# Patient Record
Sex: FEMALE | Race: BLACK OR AFRICAN AMERICAN | NOT HISPANIC OR LATINO | URBAN - METROPOLITAN AREA
[De-identification: names, ages, dates, MRNs, and addresses within clinical notes are randomized per-mention and may not be internally consistent; named-entity substitution may affect disease eponyms.]

---

## 2018-09-11 ENCOUNTER — EMERGENCY (EMERGENCY)
Facility: HOSPITAL | Age: 83
LOS: 1 days | Discharge: HOME | End: 2018-09-11
Attending: EMERGENCY MEDICINE
Payer: MEDICARE

## 2018-09-11 VITALS
DIASTOLIC BLOOD PRESSURE: 79 MMHG | OXYGEN SATURATION: 99 % | RESPIRATION RATE: 18 BRPM | TEMPERATURE: 99 F | HEART RATE: 82 BPM | SYSTOLIC BLOOD PRESSURE: 138 MMHG

## 2018-09-11 VITALS
HEART RATE: 76 BPM | SYSTOLIC BLOOD PRESSURE: 147 MMHG | RESPIRATION RATE: 16 BRPM | OXYGEN SATURATION: 100 % | DIASTOLIC BLOOD PRESSURE: 63 MMHG | TEMPERATURE: 98 F

## 2018-09-11 DIAGNOSIS — Z98.891 HISTORY OF UTERINE SCAR FROM PREVIOUS SURGERY: Chronic | ICD-10-CM

## 2018-09-11 LAB
ALBUMIN SERPL ELPH-MCNC: 3.9 G/DL — SIGNIFICANT CHANGE UP (ref 3.3–5)
ALP SERPL-CCNC: 88 U/L — SIGNIFICANT CHANGE UP (ref 40–120)
ALT FLD-CCNC: 9 U/L — LOW (ref 10–45)
ANION GAP SERPL CALC-SCNC: 13 MMOL/L — SIGNIFICANT CHANGE UP (ref 5–17)
AST SERPL-CCNC: 23 U/L — SIGNIFICANT CHANGE UP (ref 10–40)
BASOPHILS # BLD AUTO: 0 K/UL — SIGNIFICANT CHANGE UP (ref 0–0.2)
BASOPHILS NFR BLD AUTO: 0.1 % — SIGNIFICANT CHANGE UP (ref 0–2)
BILIRUB SERPL-MCNC: 0.7 MG/DL — SIGNIFICANT CHANGE UP (ref 0.2–1.2)
BUN SERPL-MCNC: 15 MG/DL — SIGNIFICANT CHANGE UP (ref 7–23)
CALCIUM SERPL-MCNC: 10 MG/DL — SIGNIFICANT CHANGE UP (ref 8.4–10.5)
CHLORIDE SERPL-SCNC: 102 MMOL/L — SIGNIFICANT CHANGE UP (ref 96–108)
CO2 SERPL-SCNC: 23 MMOL/L — SIGNIFICANT CHANGE UP (ref 22–31)
CREAT SERPL-MCNC: 0.63 MG/DL — SIGNIFICANT CHANGE UP (ref 0.5–1.3)
EOSINOPHIL # BLD AUTO: 0 K/UL — SIGNIFICANT CHANGE UP (ref 0–0.5)
EOSINOPHIL NFR BLD AUTO: 0.3 % — SIGNIFICANT CHANGE UP (ref 0–6)
GLUCOSE SERPL-MCNC: 99 MG/DL — SIGNIFICANT CHANGE UP (ref 70–99)
HCT VFR BLD CALC: 40.3 % — SIGNIFICANT CHANGE UP (ref 34.5–45)
HGB BLD-MCNC: 13.5 G/DL — SIGNIFICANT CHANGE UP (ref 11.5–15.5)
LYMPHOCYTES # BLD AUTO: 2.8 K/UL — SIGNIFICANT CHANGE UP (ref 1–3.3)
LYMPHOCYTES # BLD AUTO: 24.2 % — SIGNIFICANT CHANGE UP (ref 13–44)
MCHC RBC-ENTMCNC: 28.1 PG — SIGNIFICANT CHANGE UP (ref 27–34)
MCHC RBC-ENTMCNC: 33.5 GM/DL — SIGNIFICANT CHANGE UP (ref 32–36)
MCV RBC AUTO: 83.9 FL — SIGNIFICANT CHANGE UP (ref 80–100)
MONOCYTES # BLD AUTO: 0.6 K/UL — SIGNIFICANT CHANGE UP (ref 0–0.9)
MONOCYTES NFR BLD AUTO: 5.4 % — SIGNIFICANT CHANGE UP (ref 2–14)
NEUTROPHILS # BLD AUTO: 8 K/UL — HIGH (ref 1.8–7.4)
NEUTROPHILS NFR BLD AUTO: 70.1 % — SIGNIFICANT CHANGE UP (ref 43–77)
NT-PROBNP SERPL-SCNC: 523 PG/ML — HIGH (ref 0–300)
PLATELET # BLD AUTO: 230 K/UL — SIGNIFICANT CHANGE UP (ref 150–400)
POTASSIUM SERPL-MCNC: 4.6 MMOL/L — SIGNIFICANT CHANGE UP (ref 3.5–5.3)
POTASSIUM SERPL-SCNC: 4.6 MMOL/L — SIGNIFICANT CHANGE UP (ref 3.5–5.3)
PROT SERPL-MCNC: 7.6 G/DL — SIGNIFICANT CHANGE UP (ref 6–8.3)
RBC # BLD: 4.8 M/UL — SIGNIFICANT CHANGE UP (ref 3.8–5.2)
RBC # FLD: 13.9 % — SIGNIFICANT CHANGE UP (ref 10.3–14.5)
SODIUM SERPL-SCNC: 138 MMOL/L — SIGNIFICANT CHANGE UP (ref 135–145)
WBC # BLD: 11.4 K/UL — HIGH (ref 3.8–10.5)
WBC # FLD AUTO: 11.4 K/UL — HIGH (ref 3.8–10.5)

## 2018-09-11 PROCEDURE — 80053 COMPREHEN METABOLIC PANEL: CPT

## 2018-09-11 PROCEDURE — 99283 EMERGENCY DEPT VISIT LOW MDM: CPT

## 2018-09-11 PROCEDURE — 99284 EMERGENCY DEPT VISIT MOD MDM: CPT | Mod: GC

## 2018-09-11 PROCEDURE — 83880 ASSAY OF NATRIURETIC PEPTIDE: CPT

## 2018-09-11 PROCEDURE — 85027 COMPLETE CBC AUTOMATED: CPT

## 2018-09-11 RX ORDER — ACETAMINOPHEN 500 MG
975 TABLET ORAL ONCE
Qty: 0 | Refills: 0 | Status: COMPLETED | OUTPATIENT
Start: 2018-09-11 | End: 2018-09-11

## 2018-09-11 RX ORDER — IBUPROFEN 200 MG
600 TABLET ORAL ONCE
Qty: 0 | Refills: 0 | Status: COMPLETED | OUTPATIENT
Start: 2018-09-11 | End: 2018-09-11

## 2018-09-11 RX ADMIN — Medication 600 MILLIGRAM(S): at 21:40

## 2018-09-11 RX ADMIN — Medication 975 MILLIGRAM(S): at 22:40

## 2018-09-11 RX ADMIN — Medication 975 MILLIGRAM(S): at 21:40

## 2018-09-11 RX ADMIN — Medication 600 MILLIGRAM(S): at 22:40

## 2018-09-11 NOTE — ED ADULT NURSE NOTE - OBJECTIVE STATEMENT
patient came in with daughter complaining of bilateral foot pain x3 weeks. Right side abdominal pain radiating to right side back since saturday. Denies nausea, vomiting or diarrhea. With known history of constipation & was taking either senokot & or fleet enema. LAst bm was 2 days ago. patient came in with daughter complaining of bilateral foot pain x3 weeks. Right side dull constant abdominal pain radiating to right side back & buttock & down her right leg since saturday. Denies nausea, vomiting or diarrhea. With known history of constipation & was taking either senokot & or fleet enema. LAst bm was 2 days ago. PAtient added that 1 month ago she fell but was fine after that. Seen & examined by MD.

## 2018-09-11 NOTE — ED PROVIDER NOTE - MEDICAL DECISION MAKING DETAILS
Right paraspinal lumbar pain with sciatic component to the right leg, able to ambulate, able to reproduce the back pain and leg pain with rotation of the torso. Mild swelling of the bilateral legs, non-pitting. Basic labs, pain, control, ok to dc with follow up care Right paraspinal lumbar pain with sciatic component to the right leg, able to ambulate, able to reproduce the back pain and leg pain with rotation of the torso. Mild swelling of the bilateral legs, non-pitting. Basic labs, pain, control, ok to dc with follow up care    FANNIE Sherman MD: Pt is a 83 y/o female with PMH of hypothyroidism who p/w right paraspinal lumbar pain that radiates down the back of her R leg. Pain is worse with movement. Pt states that she fell 1 month ago but did not have pain until 3 days ago. Denies any bowel or bladder incontinence, saddle anesthesia, focal numbness or weakness. Pt is still ambulatory, but with some pain. No trauma to the area that she can recall. Also reports chronic, trace intermittent b/l pretibial edema that improves with elevation. Exam without spinal midline ttp. +paraspinal lumbosacral lateral muscle ttp, +SLR on R. Exam c/w MSK pain, likely lumbosacral radiculopathy. No red flags for cauda equina or spinal cord compression. Denies f/c, recent injections, IVDA, major trauma, recent procedures or new neurologic deficits. Plan: pain control, and if pt improves, Ortho/spine f/u within 1 week

## 2018-09-11 NOTE — ED PROVIDER NOTE - PHYSICAL EXAMINATION
msk: right paraspinal tenderness, no midline tenderness, negative straight leg test, pain reproduced down the leg with movement of the torso

## 2018-09-11 NOTE — ED PROVIDER NOTE - PLAN OF CARE
Take all medications as directed.  For pain take Ibuprofen (Motrin, Advil) 400mg-600mg every 6-8 hours or Acetaminophen (Tylenol) 250mg-650mg every 6-8 hours.  Follow up with your primary physician in 3-4 days. If needed call [phone] to find a primary care physician or call  [phone] to schedule an appointment with the general medicine clinic.   You were given copies of all labs and imaging results from this ER visit, please take them to your appointment.  Return to the ER for worsening symptoms or any other concerns.

## 2018-09-11 NOTE — ED PROVIDER NOTE - CARE PLAN
Principal Discharge DX:	Sciatic leg pain  Assessment and plan of treatment:	Take all medications as directed.  For pain take Ibuprofen (Motrin, Advil) 400mg-600mg every 6-8 hours or Acetaminophen (Tylenol) 250mg-650mg every 6-8 hours.  Follow up with your primary physician in 3-4 days. If needed call [phone] to find a primary care physician or call  [phone] to schedule an appointment with the general medicine clinic.   You were given copies of all labs and imaging results from this ER visit, please take them to your appointment.  Return to the ER for worsening symptoms or any other concerns.

## 2018-09-11 NOTE — ED PROVIDER NOTE - OBJECTIVE STATEMENT
83 yo hx of hypothyroid p/w right paraspinal lumbar pain with pain radiating around the hip to the sacrum down the leg. Pain is worse with movements. Fell 1 month ago but did not have pain until 3 days ago. Pt denies any bowel or bladder incontinence. Pt is very active at baseline, walks on her own, but recently has been limited due to the pain. No trauma to the area that she can recall. Also reports tightness and swelling in her legs that she has intermittently.

## 2018-09-11 NOTE — ED ADULT TRIAGE NOTE - CHIEF COMPLAINT QUOTE
oly lower ext swelling x 2 weeks and right sided flank pain x 1 week  denies sob and dyspnea no hx of chf

## 2019-08-04 ENCOUNTER — EMERGENCY (EMERGENCY)
Facility: HOSPITAL | Age: 84
LOS: 1 days | Discharge: HOME | End: 2019-08-04
Attending: EMERGENCY MEDICINE | Admitting: EMERGENCY MEDICINE
Payer: MEDICARE

## 2019-08-04 VITALS
HEART RATE: 81 BPM | WEIGHT: 164.91 LBS | RESPIRATION RATE: 16 BRPM | HEIGHT: 65 IN | SYSTOLIC BLOOD PRESSURE: 144 MMHG | TEMPERATURE: 98 F | DIASTOLIC BLOOD PRESSURE: 68 MMHG | OXYGEN SATURATION: 98 %

## 2019-08-04 VITALS
HEART RATE: 67 BPM | SYSTOLIC BLOOD PRESSURE: 143 MMHG | DIASTOLIC BLOOD PRESSURE: 65 MMHG | TEMPERATURE: 98 F | RESPIRATION RATE: 14 BRPM | OXYGEN SATURATION: 99 %

## 2019-08-04 DIAGNOSIS — Z98.891 HISTORY OF UTERINE SCAR FROM PREVIOUS SURGERY: Chronic | ICD-10-CM

## 2019-08-04 PROBLEM — K59.00 CONSTIPATION, UNSPECIFIED: Chronic | Status: ACTIVE | Noted: 2018-09-11

## 2019-08-04 PROBLEM — E03.9 HYPOTHYROIDISM, UNSPECIFIED: Chronic | Status: ACTIVE | Noted: 2018-09-11

## 2019-08-04 PROCEDURE — 73030 X-RAY EXAM OF SHOULDER: CPT

## 2019-08-04 PROCEDURE — 99284 EMERGENCY DEPT VISIT MOD MDM: CPT | Mod: 25

## 2019-08-04 PROCEDURE — 73030 X-RAY EXAM OF SHOULDER: CPT | Mod: 26,LT

## 2019-08-04 PROCEDURE — 72040 X-RAY EXAM NECK SPINE 2-3 VW: CPT

## 2019-08-04 PROCEDURE — 99284 EMERGENCY DEPT VISIT MOD MDM: CPT

## 2019-08-04 PROCEDURE — 72040 X-RAY EXAM NECK SPINE 2-3 VW: CPT | Mod: 26

## 2019-08-04 RX ORDER — TRAMADOL HYDROCHLORIDE 50 MG/1
50 TABLET ORAL ONCE
Refills: 0 | Status: DISCONTINUED | OUTPATIENT
Start: 2019-08-04 | End: 2019-08-04

## 2019-08-04 RX ORDER — TRAMADOL HYDROCHLORIDE 50 MG/1
1 TABLET ORAL
Qty: 12 | Refills: 0
Start: 2019-08-04

## 2019-08-04 RX ORDER — SENNA PLUS 8.6 MG/1
0 TABLET ORAL
Qty: 0 | Refills: 0 | DISCHARGE

## 2019-08-04 RX ADMIN — TRAMADOL HYDROCHLORIDE 50 MILLIGRAM(S): 50 TABLET ORAL at 15:52

## 2019-08-04 RX ADMIN — TRAMADOL HYDROCHLORIDE 50 MILLIGRAM(S): 50 TABLET ORAL at 15:53

## 2019-08-04 NOTE — ED PROVIDER NOTE - CARE PROVIDER_API CALL
Sunny Jacques)  Orthopaedic Surgery  [address] [place] [address]  Phone: [phone]  Fax: [phone]  Follow Up Time:

## 2019-08-04 NOTE — ED ADULT NURSE NOTE - NSIMPLEMENTINTERV_GEN_ALL_ED
Implemented All Fall Risk Interventions:  [place] to call system. Call bell, personal items and telephone within reach. Instruct patient to call for assistance. Room bathroom lighting operational. Non-slip footwear when patient is off stretcher. Physically safe environment: no spills, clutter or unnecessary equipment. Stretcher in lowest position, wheels locked, appropriate side rails in place. Provide visual cue, wrist band, yellow gown, etc. Monitor gait and stability. Monitor for mental status changes and reorient to person, place, and time. Review medications for side effects contributing to fall risk. Reinforce activity limits and safety measures with patient and family.

## 2019-08-04 NOTE — ED PROVIDER NOTE - CLINICAL SUMMARY MEDICAL DECISION MAKING FREE TEXT BOX
84 female s/p fall 3 weeks ago with shoudler pain 1 week ago and left arm and neck pain 3 days ago. xray, not likely cardiac in nature. 84 female s/p fall 3 weeks ago with shoulder pain 1 week ago and left arm and neck pain 3 days ago. xray, not likely cardiac in nature.

## 2019-08-04 NOTE — ED PROVIDER NOTE - PROGRESS NOTE DETAILS
[place] AS for Dr. Cardoza: 83 y/o female with a PMHx of Hypothyroidism presents to the ED c/o left shoulder pain which began 1 week ago with associated left arm pain and neck pain. Daughter at bedside states that the pt did have a fall around two weeks ago. Pt denies having any issue with the shoulder in the past. Denies any knee pain. PCP: [facility]. PE: + left neck and shoulder TTP radiating to left arm. Likely cervical radiculopathy. Plan XR, pain meds, ortho follow up.

## 2019-08-04 NOTE — ED PROVIDER NOTE - MUSCULOSKELETAL, MLM
TTP left shouplder joint, small bruise over left shoulder, painful ROM left shoulder, NV intact. + muscular ttp left shoulder girdle/paraspinal. TTP left shoulder joint, small bruise over left shoulder, painful ROM left shoulder, NV intact. + muscular ttp left shoulder girdle/paraspinal.

## 2019-08-04 NOTE — ED PROVIDER NOTE - OBJECTIVE STATEMENT
84 female with thyroid on levothyroxine present to ED with left shoulder, neck and arm pain that began 1 week ago. Pain initially began with shoulder, and then moved to neck and arm 3 days ago. Pain is constant and only relieved by Motrin, 800mg. Last dose 7am this AM. Denies chest pain, SOB. Denies fever chills.   Patient is an unreliable historian. Patient had recent fall 2-3 weeks ago. When we discussed the fall the patient was unable to tell me if the pain in the shoulder began with the fall or not.

## 2019-08-04 NOTE — ED PROVIDER NOTE - NEURO NEGATIVE STATEMENT, MLM
no loss of consciousness, no gait abnormality, no headache, no sensory deficits, and no weakness. no numbness, no tingling.

## 2019-08-04 NOTE — ED PROVIDER NOTE - CHPI ED SYMPTOMS NEG
no numbness/no deformity/no tingling/no weakness/no bruising/no difficulty bearing weight/no abrasion/no back pain

## 2020-03-22 ENCOUNTER — TRANSCRIPTION ENCOUNTER (OUTPATIENT)
Age: 85
End: 2020-03-22

## 2022-09-17 NOTE — ED ADULT NURSE NOTE - NS ED NURSE RECORD ANOTHER VITAL SIGN
Patient , absent of vital signs verified by 2 RN's. Dr. Saumya Jeter covering for attending notified and okayed release of body. . Patient's next of kin,  Juventino Chi at bedside, time given to be with patient. Life bank called and patient didn't qualify . Post mortem care then provided and [facility] chosen by Juventino Chi, they were notified and said will be here in the morning. Patient transport to this hospital [facility] until . Dr. Leif Owusu (attending) also notified of patient's death via perfect serve.
Patient was extubated to 2 liters/min via nasal cannula. Breath Sounds post extubation were clear throughout all lung fields. Stridor was not present post extubation. Patient extubated compassionately with family at bedside.     Performed by  Elizabeth Chavira RCP
Yes

## 2023-01-25 ENCOUNTER — OFFICE (OUTPATIENT)
Dept: URBAN - METROPOLITAN AREA CLINIC 94 | Facility: CLINIC | Age: 88
Setting detail: OPHTHALMOLOGY
End: 2023-01-25
Payer: MEDICARE

## 2023-01-25 DIAGNOSIS — H34.8130: ICD-10-CM

## 2023-01-25 DIAGNOSIS — H43.12: ICD-10-CM

## 2023-01-25 DIAGNOSIS — H34.8110: ICD-10-CM

## 2023-01-25 PROCEDURE — 92014 COMPRE OPH EXAM EST PT 1/>: CPT | Performed by: OPHTHALMOLOGY

## 2023-01-25 PROCEDURE — 67210 TREATMENT OF RETINAL LESION: CPT | Performed by: OPHTHALMOLOGY

## 2023-01-25 PROCEDURE — 92134 CPTRZ OPH DX IMG PST SGM RTA: CPT | Performed by: OPHTHALMOLOGY

## 2023-01-25 ASSESSMENT — VISUAL ACUITY
OS_BCVA: 20/60-1
OD_BCVA: HM

## 2023-01-25 ASSESSMENT — TONOMETRY
OD_IOP_MMHG: 17
OS_IOP_MMHG: 17

## 2023-01-25 ASSESSMENT — CONFRONTATIONAL VISUAL FIELD TEST (CVF)
OS_FINDINGS: FULL
OD_FINDINGS: FULL

## 2023-02-14 ENCOUNTER — OFFICE (OUTPATIENT)
Dept: URBAN - METROPOLITAN AREA CLINIC 94 | Facility: CLINIC | Age: 88
Setting detail: OPHTHALMOLOGY
End: 2023-02-14
Payer: MEDICARE

## 2023-02-14 DIAGNOSIS — H34.8130: ICD-10-CM

## 2023-02-14 DIAGNOSIS — H34.8110: ICD-10-CM

## 2023-02-14 DIAGNOSIS — H43.12: ICD-10-CM

## 2023-02-14 PROCEDURE — 92134 CPTRZ OPH DX IMG PST SGM RTA: CPT | Performed by: OPHTHALMOLOGY

## 2023-02-14 PROCEDURE — 67028 INJECTION EYE DRUG: CPT | Performed by: OPHTHALMOLOGY

## 2023-02-14 ASSESSMENT — TONOMETRY
OD_IOP_MMHG: 20
OS_IOP_MMHG: 16

## 2023-02-14 ASSESSMENT — VISUAL ACUITY
OS_BCVA: 20/70-1
OD_BCVA: HM

## 2023-02-23 ENCOUNTER — INPATIENT (INPATIENT)
Facility: HOSPITAL | Age: 88
LOS: 5 days | Discharge: HOME | DRG: 871 | End: 2023-03-01
Attending: INTERNAL MEDICINE | Admitting: INTERNAL MEDICINE
Payer: MEDICARE

## 2023-02-23 VITALS
WEIGHT: 147.49 LBS | HEART RATE: 94 BPM | OXYGEN SATURATION: 94 % | TEMPERATURE: 102 F | HEIGHT: 65 IN | DIASTOLIC BLOOD PRESSURE: 74 MMHG | SYSTOLIC BLOOD PRESSURE: 126 MMHG | RESPIRATION RATE: 17 BRPM

## 2023-02-23 DIAGNOSIS — A41.9 SEPSIS, UNSPECIFIED ORGANISM: ICD-10-CM

## 2023-02-23 DIAGNOSIS — Z98.891 HISTORY OF UTERINE SCAR FROM PREVIOUS SURGERY: Chronic | ICD-10-CM

## 2023-02-23 LAB
ALBUMIN SERPL ELPH-MCNC: 3.1 G/DL — LOW (ref 3.3–5)
ALP SERPL-CCNC: 79 U/L — SIGNIFICANT CHANGE UP (ref 30–120)
ALT FLD-CCNC: 26 U/L DA — SIGNIFICANT CHANGE UP (ref 10–60)
ANION GAP SERPL CALC-SCNC: 12 MMOL/L — SIGNIFICANT CHANGE UP (ref 5–17)
APPEARANCE UR: CLEAR — SIGNIFICANT CHANGE UP
APTT BLD: 26.9 SEC — LOW (ref 27.5–35.5)
AST SERPL-CCNC: 40 U/L — SIGNIFICANT CHANGE UP (ref 10–40)
BASOPHILS # BLD AUTO: 0.03 K/UL — SIGNIFICANT CHANGE UP (ref 0–0.2)
BASOPHILS NFR BLD AUTO: 0.2 % — SIGNIFICANT CHANGE UP (ref 0–2)
BILIRUB SERPL-MCNC: 0.8 MG/DL — SIGNIFICANT CHANGE UP (ref 0.2–1.2)
BILIRUB UR-MCNC: NEGATIVE — SIGNIFICANT CHANGE UP
BUN SERPL-MCNC: 17 MG/DL — SIGNIFICANT CHANGE UP (ref 7–23)
CALCIUM SERPL-MCNC: 8.7 MG/DL — SIGNIFICANT CHANGE UP (ref 8.4–10.5)
CHLORIDE SERPL-SCNC: 99 MMOL/L — SIGNIFICANT CHANGE UP (ref 96–108)
CO2 SERPL-SCNC: 21 MMOL/L — LOW (ref 22–31)
COLOR SPEC: YELLOW — SIGNIFICANT CHANGE UP
CREAT SERPL-MCNC: 1.03 MG/DL — SIGNIFICANT CHANGE UP (ref 0.5–1.3)
DIFF PNL FLD: ABNORMAL
EGFR: 52 ML/MIN/1.73M2 — LOW
EOSINOPHIL # BLD AUTO: 0 K/UL — SIGNIFICANT CHANGE UP (ref 0–0.5)
EOSINOPHIL NFR BLD AUTO: 0 % — SIGNIFICANT CHANGE UP (ref 0–6)
GLUCOSE SERPL-MCNC: 155 MG/DL — HIGH (ref 70–99)
GLUCOSE UR QL: NEGATIVE MG/DL — SIGNIFICANT CHANGE UP
HCT VFR BLD CALC: 41.5 % — SIGNIFICANT CHANGE UP (ref 34.5–45)
HGB BLD-MCNC: 13.8 G/DL — SIGNIFICANT CHANGE UP (ref 11.5–15.5)
IMM GRANULOCYTES NFR BLD AUTO: 0.6 % — SIGNIFICANT CHANGE UP (ref 0–0.9)
INR BLD: 1.32 RATIO — HIGH (ref 0.88–1.16)
KETONES UR-MCNC: NEGATIVE — SIGNIFICANT CHANGE UP
LACTATE SERPL-SCNC: 1.7 MMOL/L — SIGNIFICANT CHANGE UP (ref 0.7–2)
LEUKOCYTE ESTERASE UR-ACNC: NEGATIVE — SIGNIFICANT CHANGE UP
LYMPHOCYTES # BLD AUTO: 0.5 K/UL — LOW (ref 1–3.3)
LYMPHOCYTES # BLD AUTO: 2.9 % — LOW (ref 13–44)
MAGNESIUM SERPL-MCNC: 1.5 MG/DL — LOW (ref 1.6–2.6)
MCHC RBC-ENTMCNC: 28.5 PG — SIGNIFICANT CHANGE UP (ref 27–34)
MCHC RBC-ENTMCNC: 33.3 GM/DL — SIGNIFICANT CHANGE UP (ref 32–36)
MCV RBC AUTO: 85.6 FL — SIGNIFICANT CHANGE UP (ref 80–100)
MONOCYTES # BLD AUTO: 0.49 K/UL — SIGNIFICANT CHANGE UP (ref 0–0.9)
MONOCYTES NFR BLD AUTO: 2.8 % — SIGNIFICANT CHANGE UP (ref 2–14)
NEUTROPHILS # BLD AUTO: 16.19 K/UL — HIGH (ref 1.8–7.4)
NEUTROPHILS NFR BLD AUTO: 93.5 % — HIGH (ref 43–77)
NITRITE UR-MCNC: NEGATIVE — SIGNIFICANT CHANGE UP
NRBC # BLD: 0 /100 WBCS — SIGNIFICANT CHANGE UP (ref 0–0)
PH UR: 5 — SIGNIFICANT CHANGE UP (ref 5–8)
PLATELET # BLD AUTO: 181 K/UL — SIGNIFICANT CHANGE UP (ref 150–400)
POTASSIUM SERPL-MCNC: 3.7 MMOL/L — SIGNIFICANT CHANGE UP (ref 3.5–5.3)
POTASSIUM SERPL-SCNC: 3.7 MMOL/L — SIGNIFICANT CHANGE UP (ref 3.5–5.3)
PROT SERPL-MCNC: 7.3 G/DL — SIGNIFICANT CHANGE UP (ref 6–8.3)
PROT UR-MCNC: 30 MG/DL
PROTHROM AB SERPL-ACNC: 15.6 SEC — HIGH (ref 10.5–13.4)
RBC # BLD: 4.85 M/UL — SIGNIFICANT CHANGE UP (ref 3.8–5.2)
RBC # FLD: 14.6 % — HIGH (ref 10.3–14.5)
SARS-COV-2 RNA SPEC QL NAA+PROBE: SIGNIFICANT CHANGE UP
SODIUM SERPL-SCNC: 132 MMOL/L — LOW (ref 135–145)
SP GR SPEC: 1.01 — SIGNIFICANT CHANGE UP (ref 1.01–1.02)
UROBILINOGEN FLD QL: NEGATIVE MG/DL — SIGNIFICANT CHANGE UP
WBC # BLD: 17.31 K/UL — HIGH (ref 3.8–10.5)
WBC # FLD AUTO: 17.31 K/UL — HIGH (ref 3.8–10.5)

## 2023-02-23 PROCEDURE — 71260 CT THORAX DX C+: CPT | Mod: 26,MA

## 2023-02-23 PROCEDURE — 99223 1ST HOSP IP/OBS HIGH 75: CPT

## 2023-02-23 PROCEDURE — 73080 X-RAY EXAM OF ELBOW: CPT | Mod: 26,RT

## 2023-02-23 PROCEDURE — 74177 CT ABD & PELVIS W/CONTRAST: CPT | Mod: 26,MA

## 2023-02-23 PROCEDURE — 71045 X-RAY EXAM CHEST 1 VIEW: CPT | Mod: 26

## 2023-02-23 PROCEDURE — 70450 CT HEAD/BRAIN W/O DYE: CPT | Mod: 26,MA

## 2023-02-23 PROCEDURE — 93010 ELECTROCARDIOGRAM REPORT: CPT

## 2023-02-23 PROCEDURE — 99285 EMERGENCY DEPT VISIT HI MDM: CPT | Mod: CS

## 2023-02-23 PROCEDURE — 72125 CT NECK SPINE W/O DYE: CPT | Mod: 26,MA

## 2023-02-23 RX ORDER — SODIUM CHLORIDE 9 MG/ML
1000 INJECTION, SOLUTION INTRAVENOUS
Refills: 0 | Status: DISCONTINUED | OUTPATIENT
Start: 2023-02-23 | End: 2023-02-28

## 2023-02-23 RX ORDER — PIPERACILLIN AND TAZOBACTAM 4; .5 G/20ML; G/20ML
3.38 INJECTION, POWDER, LYOPHILIZED, FOR SOLUTION INTRAVENOUS ONCE
Refills: 0 | Status: COMPLETED | OUTPATIENT
Start: 2023-02-23 | End: 2023-02-23

## 2023-02-23 RX ORDER — ONDANSETRON 8 MG/1
4 TABLET, FILM COATED ORAL EVERY 8 HOURS
Refills: 0 | Status: DISCONTINUED | OUTPATIENT
Start: 2023-02-23 | End: 2023-03-01

## 2023-02-23 RX ORDER — MAGNESIUM SULFATE 500 MG/ML
2 VIAL (ML) INJECTION ONCE
Refills: 0 | Status: COMPLETED | OUTPATIENT
Start: 2023-02-23 | End: 2023-02-23

## 2023-02-23 RX ORDER — SODIUM CHLORIDE 9 MG/ML
2100 INJECTION, SOLUTION INTRAVENOUS ONCE
Refills: 0 | Status: COMPLETED | OUTPATIENT
Start: 2023-02-23 | End: 2023-02-23

## 2023-02-23 RX ORDER — ACETAMINOPHEN 500 MG
650 TABLET ORAL EVERY 6 HOURS
Refills: 0 | Status: DISCONTINUED | OUTPATIENT
Start: 2023-02-23 | End: 2023-03-01

## 2023-02-23 RX ORDER — ACETAMINOPHEN 500 MG
1000 TABLET ORAL ONCE
Refills: 0 | Status: COMPLETED | OUTPATIENT
Start: 2023-02-23 | End: 2023-02-23

## 2023-02-23 RX ORDER — ENOXAPARIN SODIUM 100 MG/ML
40 INJECTION SUBCUTANEOUS EVERY 24 HOURS
Refills: 0 | Status: DISCONTINUED | OUTPATIENT
Start: 2023-02-23 | End: 2023-03-01

## 2023-02-23 RX ORDER — LANOLIN ALCOHOL/MO/W.PET/CERES
3 CREAM (GRAM) TOPICAL AT BEDTIME
Refills: 0 | Status: DISCONTINUED | OUTPATIENT
Start: 2023-02-23 | End: 2023-03-01

## 2023-02-23 RX ORDER — PIPERACILLIN AND TAZOBACTAM 4; .5 G/20ML; G/20ML
3.38 INJECTION, POWDER, LYOPHILIZED, FOR SOLUTION INTRAVENOUS ONCE
Refills: 0 | Status: COMPLETED | OUTPATIENT
Start: 2023-02-24 | End: 2023-02-23

## 2023-02-23 RX ADMIN — SODIUM CHLORIDE 2100 MILLILITER(S): 9 INJECTION, SOLUTION INTRAVENOUS at 21:09

## 2023-02-23 RX ADMIN — Medication 400 MILLIGRAM(S): at 21:00

## 2023-02-23 RX ADMIN — Medication 25 GRAM(S): at 22:36

## 2023-02-23 RX ADMIN — PIPERACILLIN AND TAZOBACTAM 3.38 GRAM(S): 4; .5 INJECTION, POWDER, LYOPHILIZED, FOR SOLUTION INTRAVENOUS at 21:40

## 2023-02-23 RX ADMIN — Medication 1000 MILLIGRAM(S): at 21:30

## 2023-02-23 RX ADMIN — PIPERACILLIN AND TAZOBACTAM 200 GRAM(S): 4; .5 INJECTION, POWDER, LYOPHILIZED, FOR SOLUTION INTRAVENOUS at 21:09

## 2023-02-23 RX ADMIN — PIPERACILLIN AND TAZOBACTAM 25 GRAM(S): 4; .5 INJECTION, POWDER, LYOPHILIZED, FOR SOLUTION INTRAVENOUS at 23:44

## 2023-02-23 RX ADMIN — Medication 1000 MILLIGRAM(S): at 21:15

## 2023-02-23 NOTE — H&P ADULT - CONVERSATION DETAILS
Discussed diagnosis and GOC with patient. Asked if she has a HCP - states that she does not. Inquired if she would like to fill one out or if she has any advanced directives - she does not want to appoint a HCP at this time. Currently full code.

## 2023-02-23 NOTE — ED ADULT NURSE REASSESSMENT NOTE - NS ED NURSE REASSESS COMMENT FT1
pt changed. Diarrhea in diaper. Skin clean, dry and intact. Minor redness that is blanchable to the buttocks.

## 2023-02-23 NOTE — ED ADULT NURSE NOTE - NSIMPLEMENTINTERV_GEN_ALL_ED
Implemented All Fall Risk Interventions:  [place] to call system. Call bell, personal items and telephone within reach. Instruct patient to call for assistance. Room bathroom lighting operational. Non-slip footwear when patient is off stretcher. Physically safe environment: no spills, clutter or unnecessary equipment. Stretcher in lowest position, wheels locked, appropriate side rails in place. Provide visual cue, wrist band, yellow gown, etc. Monitor gait and stability. Monitor for mental status changes and reorient to person, place, and time. Review medications for side effects contributing to fall risk. Reinforce activity limits and safety measures with patient and family.

## 2023-02-23 NOTE — ED ADULT NURSE NOTE - OBJECTIVE STATEMENT
Pt is alert and oriented. Pt was vomiting on Tuesday. Pt states that she has had diarrhea since Tuesday. Pt denies abdominal pain. Abdomen is not tender to palpation. Pt states that she fell this morning. Pts fall was unwitnessed and she is unsure if she hit her head. Pt denies blood thinners. Pt placed on cm. Tele monitor made aware. Pt denies sob, chest pain, nausea, vomiting, and dizziness. Pt resp are even and unlabored, skin color chad for race. Pt updated on plan of care.

## 2023-02-23 NOTE — H&P ADULT - HISTORY OF PRESENT ILLNESS
88 year old female with PMHx of hypothyroidism presented to the ED complaining of 4 days of diarrhea and progressive weakness. States that she has been having nausea and vomiting. Not eating because of this. Only able to tolerate water. She states that she fell earlier on the day of admission, says that she was on the floor for hours until found by daughter. Daughter states that she fell in the morning but does not think she was on the floor for that long. No head trauma or LOC. Was complaining of some right elbow pain earlier but this has now resolved. Has been progressively weaker and lethargic. Daughter states that she has some memory issues but has been a little more confused from baseline. She also mentions that patient had some bad Chinese food before her vomiting started. She was also with her great-grandson, who is now in a different state but later found out that he developed a diarrheal/vomiting illness as well. Patient had a colonoscopy many years ago but not recently - does not have a GI doctor. Denies chest pain, palpitations, dyspnea, headache, dizziness, abdominal pain.

## 2023-02-23 NOTE — H&P ADULT - NSHPLABSRESULTS_GEN_ALL_CORE
.  XR right elbow: no acute radiologic bony pathology - no fracture seen on personal review    CT head/C-spine: No acute intracranial CT abnormality. No acute cervical spine fracture or traumatic malalignment.    CT CAP with IV contrast: Clear lungs. Findings are compatible with a nonspecific diarrheal illness. Indeterminate left breast nodule located in the inner quadrant, near the anterior margin of the left fourth rib. Follow-up at a dedicated breast imaging Center advised. Apparent 8 mm enhancing nodule in the ascending colon. Follow-up colonoscopy suggested.    EKG: SR with PACs, LAFB, VR 100bpm    Labs, Imaging and EKG all personally reviewed by the attending physician.

## 2023-02-23 NOTE — H&P ADULT - NSHPPHYSICALEXAM_GEN_ALL_CORE
T(C): 36.8 (02-24-23 @ 00:15), Max: 38.9 (02-23-23 @ 20:16)  HR: 80 (02-24-23 @ 00:15) (80 - 94)  BP: 114/78 (02-24-23 @ 00:15) (109/53 - 126/74)  RR: 18 (02-24-23 @ 00:15) (15 - 20)  SpO2: 94% (02-24-23 @ 00:15) (94% - 99%)    General: No apparent distress, somnolent  Head: normocephalic, atraumatic  Eyes: EOMI, anicteric  ENT: dry mucous membranes, no pharyngeal exudates  Heart: rrr, S1, S2, no murmurs  Chest: CTA b/l, no rales, rhonchi, or wheezes  Abd: BS+, soft, NT, ND  Back: no tenderness  Extr: no edema or cyanosis  Neuro: Sleepy but arousable, AA&Ox3, no focal weakness, sensation to light touch intact  Psych: normal affect

## 2023-02-23 NOTE — H&P ADULT - NSHPREVIEWOFSYSTEMS_GEN_ALL_CORE
General: ADMITS fever, weakness, lethargy  Eyes: no vision changes  ENT: no hearing changes, nasal congestion, or sore throat  CV: no chest pain or palpitations  Pulm: no SOB, wheezing, cough, or hemoptysis  Abd/GI: no constipation, abd pain; ADMITS nausea, vomiting, diarrhea,   : no dysuria, hematuria, urinary frequency  MSK: no joint pain or myalgias  Neuro: no syncope, dizziness, focal weakness; ADMITS lethargy  Psych: no anxiety or depression  Endo: no heat or cold intolerance

## 2023-02-23 NOTE — H&P ADULT - TIME BILLING
activities including direct patient care, counseling and/or coordinating care (re: acute diarrheal illness, sepsis, incidental imaging findings, expected hospital course), reviewing notes/lab data/imaging, and discussion with multidisciplinary team

## 2023-02-23 NOTE — ED PROVIDER NOTE - CLINICAL SUMMARY MEDICAL DECISION MAKING FREE TEXT BOX
3d diarrhea and weakness, fell today, found to be febrile here in the ED - iv, labs, ct abd, fluids, cultures, abx, admit

## 2023-02-23 NOTE — ED ADULT TRIAGE NOTE - CHIEF COMPLAINT QUOTE
Pt c/o diarrhea and vomiting x4 days; pt accompanied by daughter; pt unable to ambulate; c/o weakness

## 2023-02-23 NOTE — ED PROVIDER NOTE - OBJECTIVE STATEMENT
88 y.o. F BIB family for diarrhea/vomiting x 3 days, becoming weaker, can barely stand now, fell earlier, was not witnessed, pt feels like she hurt her right elbow, no known loc,

## 2023-02-23 NOTE — H&P ADULT - NSICDXFAMILYHX_GEN_ALL_CORE_FT
FAMILY HISTORY:  FH: diabetes mellitus    Child  Still living? Yes, Estimated age: Age Unknown  FHx: breast cancer, Age at diagnosis: Age Unknown

## 2023-02-23 NOTE — H&P ADULT - ASSESSMENT
88 year old female with PMHx of hypothyroidism admitted with sepsis secondary to acute diarrheal illness.    #Acute diarrheal illness  - Admit to medicine  - CT reviewed - c/w diarrheal illness, also with possible 8mm nodule in ascending colon  - Will need eventual colonoscopy - outpatient  - Sepsis POA (leukocytosis, HR >90, fever, intra-abdominal source)  - Ate Chinese food and had sick contact - f/u GI PCR and stool culture  - C-diff PCR sent but no apparent risk factors (although daughter is an RN)  - Continue intravenous piperacillin-tazobactam  - Continue LR at 75cc/hr  - Continue CLD for now - advance as tolerated  - Follow up blood, urine, stool cultures  - GI consult Dr. Cameron Wood  - ID consult Dr. Michelle Guzman    #Sepsis  - follow culture data and continue antibiotics as above  - with some lethargy and occasional confusion as per daughter  - CT head negative, likely metabolic encephalopathy    #Weakness  - likely due to acute illness and diarrheal losses  - continue IV fluids and replete lytes as needed  - hypomagnesemia treated with IV mag sulfate  - monitor electrolytes  - PT consult    #Fall at home  -  XR of right elbow negative for fracture  - No head trauma - CT negative  - Patient states she was on the floor for hours - check CK  - Fall precautions    #Abnormal imaging  - left breast nodule and possible ascending colon nodule noted on CT imaging  - discussed findings with daughter who is an RN and has a hx of left breast CA  - to get breast imaging and colonoscopy as an outpatient    #Hypothyroidism  - continue synthroid  - check TSH    #Need for prophylactic measure  - VTE prophylaxis: subcutaneous enoxaparin

## 2023-02-24 LAB
ALBUMIN SERPL ELPH-MCNC: 2.4 G/DL — LOW (ref 3.3–5)
ALP SERPL-CCNC: 59 U/L — SIGNIFICANT CHANGE UP (ref 30–120)
ALT FLD-CCNC: 20 U/L DA — SIGNIFICANT CHANGE UP (ref 10–60)
ANION GAP SERPL CALC-SCNC: 10 MMOL/L — SIGNIFICANT CHANGE UP (ref 5–17)
AST SERPL-CCNC: 34 U/L — SIGNIFICANT CHANGE UP (ref 10–40)
BASOPHILS # BLD AUTO: 0.03 K/UL — SIGNIFICANT CHANGE UP (ref 0–0.2)
BASOPHILS NFR BLD AUTO: 0.3 % — SIGNIFICANT CHANGE UP (ref 0–2)
BILIRUB SERPL-MCNC: 0.7 MG/DL — SIGNIFICANT CHANGE UP (ref 0.2–1.2)
BUN SERPL-MCNC: 18 MG/DL — SIGNIFICANT CHANGE UP (ref 7–23)
C DIFF BY PCR RESULT: SIGNIFICANT CHANGE UP
CALCIUM SERPL-MCNC: 8.5 MG/DL — SIGNIFICANT CHANGE UP (ref 8.4–10.5)
CEA SERPL-MCNC: 1.7 NG/ML — SIGNIFICANT CHANGE UP (ref 0–3.8)
CHLORIDE SERPL-SCNC: 102 MMOL/L — SIGNIFICANT CHANGE UP (ref 96–108)
CK SERPL-CCNC: 257 U/L — HIGH (ref 26–192)
CO2 SERPL-SCNC: 25 MMOL/L — SIGNIFICANT CHANGE UP (ref 22–31)
CREAT SERPL-MCNC: 0.94 MG/DL — SIGNIFICANT CHANGE UP (ref 0.5–1.3)
EGFR: 58 ML/MIN/1.73M2 — LOW
EOSINOPHIL # BLD AUTO: 0 K/UL — SIGNIFICANT CHANGE UP (ref 0–0.5)
EOSINOPHIL NFR BLD AUTO: 0 % — SIGNIFICANT CHANGE UP (ref 0–6)
GI PCR PANEL: DETECTED
GLUCOSE SERPL-MCNC: 90 MG/DL — SIGNIFICANT CHANGE UP (ref 70–99)
HCT VFR BLD CALC: 35.1 % — SIGNIFICANT CHANGE UP (ref 34.5–45)
HGB BLD-MCNC: 11.6 G/DL — SIGNIFICANT CHANGE UP (ref 11.5–15.5)
IMM GRANULOCYTES NFR BLD AUTO: 0.4 % — SIGNIFICANT CHANGE UP (ref 0–0.9)
LYMPHOCYTES # BLD AUTO: 1.12 K/UL — SIGNIFICANT CHANGE UP (ref 1–3.3)
LYMPHOCYTES # BLD AUTO: 9.9 % — LOW (ref 13–44)
MAGNESIUM SERPL-MCNC: 2.4 MG/DL — SIGNIFICANT CHANGE UP (ref 1.6–2.6)
MCHC RBC-ENTMCNC: 28.2 PG — SIGNIFICANT CHANGE UP (ref 27–34)
MCHC RBC-ENTMCNC: 33 GM/DL — SIGNIFICANT CHANGE UP (ref 32–36)
MCV RBC AUTO: 85.4 FL — SIGNIFICANT CHANGE UP (ref 80–100)
MONOCYTES # BLD AUTO: 0.42 K/UL — SIGNIFICANT CHANGE UP (ref 0–0.9)
MONOCYTES NFR BLD AUTO: 3.7 % — SIGNIFICANT CHANGE UP (ref 2–14)
NEUTROPHILS # BLD AUTO: 9.75 K/UL — HIGH (ref 1.8–7.4)
NEUTROPHILS NFR BLD AUTO: 85.7 % — HIGH (ref 43–77)
NRBC # BLD: 0 /100 WBCS — SIGNIFICANT CHANGE UP (ref 0–0)
PHOSPHATE SERPL-MCNC: 3.4 MG/DL — SIGNIFICANT CHANGE UP (ref 2.5–4.5)
PLATELET # BLD AUTO: 151 K/UL — SIGNIFICANT CHANGE UP (ref 150–400)
POTASSIUM SERPL-MCNC: 3.4 MMOL/L — LOW (ref 3.5–5.3)
POTASSIUM SERPL-SCNC: 3.4 MMOL/L — LOW (ref 3.5–5.3)
PROCALCITONIN SERPL-MCNC: 1.34 NG/ML — HIGH (ref 0.02–0.1)
PROT SERPL-MCNC: 5.4 G/DL — LOW (ref 6–8.3)
RBC # BLD: 4.11 M/UL — SIGNIFICANT CHANGE UP (ref 3.8–5.2)
RBC # FLD: 14.6 % — HIGH (ref 10.3–14.5)
RV RNA STL NAA+PROBE: DETECTED
SODIUM SERPL-SCNC: 137 MMOL/L — SIGNIFICANT CHANGE UP (ref 135–145)
TSH SERPL-MCNC: 0.1 UIU/ML — LOW (ref 0.27–4.2)
WBC # BLD: 11.36 K/UL — HIGH (ref 3.8–10.5)
WBC # FLD AUTO: 11.36 K/UL — HIGH (ref 3.8–10.5)

## 2023-02-24 PROCEDURE — 99233 SBSQ HOSP IP/OBS HIGH 50: CPT

## 2023-02-24 RX ORDER — CHOLESTYRAMINE 4 G/9G
4 POWDER, FOR SUSPENSION ORAL DAILY
Refills: 0 | Status: DISCONTINUED | OUTPATIENT
Start: 2023-02-24 | End: 2023-02-28

## 2023-02-24 RX ORDER — LACTOBACILLUS ACIDOPHILUS 100MM CELL
1 CAPSULE ORAL
Refills: 0 | Status: DISCONTINUED | OUTPATIENT
Start: 2023-02-24 | End: 2023-03-01

## 2023-02-24 RX ORDER — POTASSIUM CHLORIDE 20 MEQ
40 PACKET (EA) ORAL ONCE
Refills: 0 | Status: COMPLETED | OUTPATIENT
Start: 2023-02-24 | End: 2023-02-24

## 2023-02-24 RX ORDER — PIPERACILLIN AND TAZOBACTAM 4; .5 G/20ML; G/20ML
3.38 INJECTION, POWDER, LYOPHILIZED, FOR SOLUTION INTRAVENOUS EVERY 8 HOURS
Refills: 0 | Status: DISCONTINUED | OUTPATIENT
Start: 2023-02-25 | End: 2023-02-25

## 2023-02-24 RX ORDER — LEVOTHYROXINE SODIUM 125 MCG
75 TABLET ORAL DAILY
Refills: 0 | Status: DISCONTINUED | OUTPATIENT
Start: 2023-02-24 | End: 2023-03-01

## 2023-02-24 RX ORDER — SACCHAROMYCES BOULARDII 250 MG
250 POWDER IN PACKET (EA) ORAL
Refills: 0 | Status: DISCONTINUED | OUTPATIENT
Start: 2023-02-24 | End: 2023-02-28

## 2023-02-24 RX ORDER — LEVOTHYROXINE SODIUM 125 MCG
0 TABLET ORAL
Qty: 0 | Refills: 0 | DISCHARGE

## 2023-02-24 RX ORDER — LEVOTHYROXINE SODIUM 125 MCG
1 TABLET ORAL
Qty: 0 | Refills: 0 | DISCHARGE

## 2023-02-24 RX ADMIN — SODIUM CHLORIDE 75 MILLILITER(S): 9 INJECTION, SOLUTION INTRAVENOUS at 00:10

## 2023-02-24 RX ADMIN — Medication 75 MICROGRAM(S): at 05:45

## 2023-02-24 RX ADMIN — Medication 1 TABLET(S): at 16:51

## 2023-02-24 RX ADMIN — Medication 250 MILLIGRAM(S): at 16:51

## 2023-02-24 RX ADMIN — Medication 250 MILLIGRAM(S): at 05:44

## 2023-02-24 RX ADMIN — CHOLESTYRAMINE 4 GRAM(S): 4 POWDER, FOR SUSPENSION ORAL at 12:10

## 2023-02-24 RX ADMIN — Medication 40 MILLIEQUIVALENT(S): at 12:10

## 2023-02-24 RX ADMIN — SODIUM CHLORIDE 75 MILLILITER(S): 9 INJECTION, SOLUTION INTRAVENOUS at 12:26

## 2023-02-24 RX ADMIN — ENOXAPARIN SODIUM 40 MILLIGRAM(S): 100 INJECTION SUBCUTANEOUS at 05:44

## 2023-02-24 NOTE — PATIENT CHOICE NOTE. - NSPTCHOICESTATE_GEN_ALL_CORE

## 2023-02-24 NOTE — PHYSICAL THERAPY INITIAL EVALUATION ADULT - PERTINENT HX OF CURRENT PROBLEM, REHAB EVAL
88 year old female with PMHx of hypothyroidism presented to the ED complaining of 4 days of diarrhea and progressive weakness. States that she has been having nausea and vomiting. Not eating because of this. Only able to tolerate water. She states that she fell earlier on the day of admission, says that she was on the floor for hours until found by daughter. Daughter states that she fell in the morning but does not think she was on the floor for that long. No head trauma or LOC. Was complaining of some right elbow pain earlier but this has now resolved. Has been progressively weaker and lethargic. Daughter states that she has some memory issues but has been a little more confused from baseline. She also mentions that patient had some bad Chinese food before her vomiting started. She was also with her great-grandson, who is now in a different state but later found out that he developed a diarrheal/vomiting illness as well. Patient had a colonoscopy many years ago but not recently - does not have a GI doctor. Denies chest pain, palpitations, dyspnea, headache, dizziness, abdominal pain.  Right elbow x-ray->neg. fx; Head CT->neg.

## 2023-02-24 NOTE — CARE COORDINATION ASSESSMENT. - NSDCPLANSERVICES_GEN_ALL_CORE
88 year old female with PMHx of hypothyroidism presented to the ED complaining of 4 days of diarrhea and progressive weakness. States that she has been having nausea and vomiting. Not eating because of this. Only able to tolerate water. She states that she fell earlier on the day of admission, says that she was on the floor for hours until found by daughter.  CM met with patient and daughter Archana [phone] and explained role, both stated understanding. Prior to admission pt lives in a private house with daughter and granddaughter, with 2 steps to enter and 12 downstairs to her bedroom. Patient was ind and able to manage all daily living activities. Denies any equipment /services in the home. Daughter feel she could use a rolling walker/Commode for safety since patient has been having falls recently. CM spoke to PT to evaluate for equipment. D/C planning discussed, if home care is needed, daughter would like referral to [facility]. CM will sent referral accordingly. Family will be available to transport once medically stable for d/c .    PCP Lobo Stanley  [phone]  pharmacy [facility] [place] Care

## 2023-02-24 NOTE — CARE COORDINATION ASSESSMENT. - NSCAREPROVIDERS_GEN_ALL_CORE_FT
CARE PROVIDERS:  Accepting Physician: Dwayne Castano  Administration: Abbey Caceres  Admitting: Dwayne Castano  Attending: Dwayne Castano  Case Management: Mabel Kerr  Consultant: Michelle Guzman  ED Attending: Kandi Larson  ED Nurse: Adolfo Vasquez  Nurse: Kati Boland  Nurse: Bailee Zarate  Nurse: Puneet Pink  Nurse: Anastasiya Bush  Ordered: Doctor, Unknown  Outpatient Provider: Cameron Wood  Override: Puneet Pink  PCA/Nursing Assistant: Sumeet Fam  Physical Therapy: Wayne Rolon  Primary Team: Stas Valdes  Primary Team: Lizeth Diamond  Registered Dietitian: Dionne Arroyo  Team: [facility], Team

## 2023-02-24 NOTE — PATIENT PROFILE ADULT - FALL HARM RISK - HARM RISK INTERVENTIONS

## 2023-02-24 NOTE — CONSULT NOTE ADULT - ASSESSMENT
abn imaging  diarrhea  food poisoning    plan  follow up stool studies  bacid one tab tid  low residue lactose free diet  if stool negative consider imodium  replete electrolytes as needed   will follow  add cholestyramine 4 gr once a day  will need outpt colonosopcy to be done for ascending colon nodule    Advanced care planning was discussed with patient and family.  Advanced care planning forms were reviewed and discussed.  Risks, benefits and alternatives of gastroenterologic procedures were discussed in detail and all questions were answered.    30 minutes spent.

## 2023-02-24 NOTE — CONSULT NOTE ADULT - ASSESSMENT
88 year old female with PMHx of hypothyroidism admitted with sepsis secondary to acute diarrheal illness.    Sepsis 2/2 Gatroenteritis  Acute Diarrhea  - pt p/w fever, tachycardia and leukocytosis   - reporting eating 2 day old Chinese food  - no recent Abx use  - imaging reviewed as above  - stool studies pending   -- low suspicion for C. diff  - started on zosyn  Plan:   - c/w zosyn for now  - f/u stool studies  - f/u BCx  - supportive care, IVFs as needed, replete lytes prn  - appreciate GI recs    Overall c/f foodborne illness 2/2 bacterial toxin production, will review above studies to determine final course of therapy    Dr. Springer covering weekend service  Infectious Diseases will continue to follow. Please call with any questions.   Michelle Guzman M.D.  [facility] [phone]

## 2023-02-25 LAB
ANION GAP SERPL CALC-SCNC: 8 MMOL/L — SIGNIFICANT CHANGE UP (ref 5–17)
BUN SERPL-MCNC: 14 MG/DL — SIGNIFICANT CHANGE UP (ref 7–23)
CALCIUM SERPL-MCNC: 8.6 MG/DL — SIGNIFICANT CHANGE UP (ref 8.4–10.5)
CHLORIDE SERPL-SCNC: 106 MMOL/L — SIGNIFICANT CHANGE UP (ref 96–108)
CO2 SERPL-SCNC: 22 MMOL/L — SIGNIFICANT CHANGE UP (ref 22–31)
CREAT SERPL-MCNC: 0.6 MG/DL — SIGNIFICANT CHANGE UP (ref 0.5–1.3)
CULTURE RESULTS: NO GROWTH — SIGNIFICANT CHANGE UP
EGFR: 86 ML/MIN/1.73M2 — SIGNIFICANT CHANGE UP
GLUCOSE SERPL-MCNC: 86 MG/DL — SIGNIFICANT CHANGE UP (ref 70–99)
HCT VFR BLD CALC: 36.5 % — SIGNIFICANT CHANGE UP (ref 34.5–45)
HGB BLD-MCNC: 11.4 G/DL — LOW (ref 11.5–15.5)
MAGNESIUM SERPL-MCNC: 2.2 MG/DL — SIGNIFICANT CHANGE UP (ref 1.6–2.6)
MCHC RBC-ENTMCNC: 28 PG — SIGNIFICANT CHANGE UP (ref 27–34)
MCHC RBC-ENTMCNC: 31.2 GM/DL — LOW (ref 32–36)
MCV RBC AUTO: 89.7 FL — SIGNIFICANT CHANGE UP (ref 80–100)
NRBC # BLD: 0 /100 WBCS — SIGNIFICANT CHANGE UP (ref 0–0)
PHOSPHATE SERPL-MCNC: 2.8 MG/DL — SIGNIFICANT CHANGE UP (ref 2.5–4.5)
PLATELET # BLD AUTO: 142 K/UL — LOW (ref 150–400)
POTASSIUM SERPL-MCNC: 4.2 MMOL/L — SIGNIFICANT CHANGE UP (ref 3.5–5.3)
POTASSIUM SERPL-SCNC: 4.2 MMOL/L — SIGNIFICANT CHANGE UP (ref 3.5–5.3)
RBC # BLD: 4.07 M/UL — SIGNIFICANT CHANGE UP (ref 3.8–5.2)
RBC # FLD: 14.6 % — HIGH (ref 10.3–14.5)
SODIUM SERPL-SCNC: 136 MMOL/L — SIGNIFICANT CHANGE UP (ref 135–145)
SPECIMEN SOURCE: SIGNIFICANT CHANGE UP
WBC # BLD: 6.09 K/UL — SIGNIFICANT CHANGE UP (ref 3.8–10.5)
WBC # FLD AUTO: 6.09 K/UL — SIGNIFICANT CHANGE UP (ref 3.8–10.5)

## 2023-02-25 PROCEDURE — 99233 SBSQ HOSP IP/OBS HIGH 50: CPT

## 2023-02-25 RX ADMIN — ENOXAPARIN SODIUM 40 MILLIGRAM(S): 100 INJECTION SUBCUTANEOUS at 05:46

## 2023-02-25 RX ADMIN — CHOLESTYRAMINE 4 GRAM(S): 4 POWDER, FOR SUSPENSION ORAL at 11:02

## 2023-02-25 RX ADMIN — Medication 1 TABLET(S): at 07:35

## 2023-02-25 RX ADMIN — Medication 250 MILLIGRAM(S): at 18:30

## 2023-02-25 RX ADMIN — Medication 250 MILLIGRAM(S): at 05:46

## 2023-02-25 RX ADMIN — SODIUM CHLORIDE 75 MILLILITER(S): 9 INJECTION, SOLUTION INTRAVENOUS at 05:46

## 2023-02-25 RX ADMIN — Medication 75 MICROGRAM(S): at 05:46

## 2023-02-25 RX ADMIN — PIPERACILLIN AND TAZOBACTAM 25 GRAM(S): 4; .5 INJECTION, POWDER, LYOPHILIZED, FOR SOLUTION INTRAVENOUS at 05:45

## 2023-02-25 RX ADMIN — Medication 1 TABLET(S): at 16:57

## 2023-02-25 RX ADMIN — PIPERACILLIN AND TAZOBACTAM 25 GRAM(S): 4; .5 INJECTION, POWDER, LYOPHILIZED, FOR SOLUTION INTRAVENOUS at 13:37

## 2023-02-25 RX ADMIN — Medication 650 MILLIGRAM(S): at 11:46

## 2023-02-25 RX ADMIN — Medication 650 MILLIGRAM(S): at 12:16

## 2023-02-25 RX ADMIN — SODIUM CHLORIDE 75 MILLILITER(S): 9 INJECTION, SOLUTION INTRAVENOUS at 07:32

## 2023-02-26 LAB
ANION GAP SERPL CALC-SCNC: 4 MMOL/L — LOW (ref 5–17)
BUN SERPL-MCNC: 14 MG/DL — SIGNIFICANT CHANGE UP (ref 7–23)
CALCIUM SERPL-MCNC: 8.2 MG/DL — LOW (ref 8.4–10.5)
CHLORIDE SERPL-SCNC: 108 MMOL/L — SIGNIFICANT CHANGE UP (ref 96–108)
CO2 SERPL-SCNC: 26 MMOL/L — SIGNIFICANT CHANGE UP (ref 22–31)
CREAT SERPL-MCNC: 0.7 MG/DL — SIGNIFICANT CHANGE UP (ref 0.5–1.3)
CULTURE RESULTS: SIGNIFICANT CHANGE UP
EGFR: 83 ML/MIN/1.73M2 — SIGNIFICANT CHANGE UP
GLUCOSE SERPL-MCNC: 87 MG/DL — SIGNIFICANT CHANGE UP (ref 70–99)
HCT VFR BLD CALC: 34.9 % — SIGNIFICANT CHANGE UP (ref 34.5–45)
HGB BLD-MCNC: 11.4 G/DL — LOW (ref 11.5–15.5)
MCHC RBC-ENTMCNC: 27.8 PG — SIGNIFICANT CHANGE UP (ref 27–34)
MCHC RBC-ENTMCNC: 32.7 GM/DL — SIGNIFICANT CHANGE UP (ref 32–36)
MCV RBC AUTO: 85.1 FL — SIGNIFICANT CHANGE UP (ref 80–100)
NRBC # BLD: 0 /100 WBCS — SIGNIFICANT CHANGE UP (ref 0–0)
PLATELET # BLD AUTO: 143 K/UL — LOW (ref 150–400)
POTASSIUM SERPL-MCNC: 3.4 MMOL/L — LOW (ref 3.5–5.3)
POTASSIUM SERPL-SCNC: 3.4 MMOL/L — LOW (ref 3.5–5.3)
RBC # BLD: 4.1 M/UL — SIGNIFICANT CHANGE UP (ref 3.8–5.2)
RBC # FLD: 14.1 % — SIGNIFICANT CHANGE UP (ref 10.3–14.5)
SODIUM SERPL-SCNC: 138 MMOL/L — SIGNIFICANT CHANGE UP (ref 135–145)
SPECIMEN SOURCE: SIGNIFICANT CHANGE UP
T4 FREE SERPL-MCNC: 1.3 NG/DL — SIGNIFICANT CHANGE UP (ref 0.9–1.8)
WBC # BLD: 5.65 K/UL — SIGNIFICANT CHANGE UP (ref 3.8–10.5)
WBC # FLD AUTO: 5.65 K/UL — SIGNIFICANT CHANGE UP (ref 3.8–10.5)

## 2023-02-26 PROCEDURE — 99233 SBSQ HOSP IP/OBS HIGH 50: CPT

## 2023-02-26 RX ORDER — MORPHINE SULFATE 50 MG/1
2 CAPSULE, EXTENDED RELEASE ORAL ONCE
Refills: 0 | Status: DISCONTINUED | OUTPATIENT
Start: 2023-02-26 | End: 2023-02-26

## 2023-02-26 RX ORDER — POTASSIUM CHLORIDE 20 MEQ
40 PACKET (EA) ORAL ONCE
Refills: 0 | Status: COMPLETED | OUTPATIENT
Start: 2023-02-26 | End: 2023-02-26

## 2023-02-26 RX ORDER — PROTHROMBIN COMPLEX CONCENTRATE (HUMAN) 25.5; 16.5; 24; 22; 22; 26 [IU]/ML; [IU]/ML; [IU]/ML; [IU]/ML; [IU]/ML; [IU]/ML
2000 POWDER, FOR SOLUTION INTRAVENOUS ONCE
Refills: 0 | Status: DISCONTINUED | OUTPATIENT
Start: 2023-02-26 | End: 2023-02-26

## 2023-02-26 RX ORDER — SUCRALFATE 1 G
1 TABLET ORAL EVERY 6 HOURS
Refills: 0 | Status: DISCONTINUED | OUTPATIENT
Start: 2023-02-26 | End: 2023-02-28

## 2023-02-26 RX ADMIN — Medication 650 MILLIGRAM(S): at 19:52

## 2023-02-26 RX ADMIN — Medication 250 MILLIGRAM(S): at 05:58

## 2023-02-26 RX ADMIN — CHOLESTYRAMINE 4 GRAM(S): 4 POWDER, FOR SUSPENSION ORAL at 08:26

## 2023-02-26 RX ADMIN — Medication 40 MILLIEQUIVALENT(S): at 11:05

## 2023-02-26 RX ADMIN — MORPHINE SULFATE 2 MILLIGRAM(S): 50 CAPSULE, EXTENDED RELEASE ORAL at 23:37

## 2023-02-26 RX ADMIN — Medication 1 GRAM(S): at 23:35

## 2023-02-26 RX ADMIN — Medication 75 MICROGRAM(S): at 05:58

## 2023-02-26 RX ADMIN — ONDANSETRON 4 MILLIGRAM(S): 8 TABLET, FILM COATED ORAL at 19:56

## 2023-02-26 RX ADMIN — SODIUM CHLORIDE 75 MILLILITER(S): 9 INJECTION, SOLUTION INTRAVENOUS at 17:30

## 2023-02-26 RX ADMIN — MORPHINE SULFATE 2 MILLIGRAM(S): 50 CAPSULE, EXTENDED RELEASE ORAL at 23:35

## 2023-02-26 RX ADMIN — Medication 1 TABLET(S): at 17:30

## 2023-02-26 RX ADMIN — SODIUM CHLORIDE 75 MILLILITER(S): 9 INJECTION, SOLUTION INTRAVENOUS at 03:38

## 2023-02-26 RX ADMIN — Medication 1 TABLET(S): at 08:25

## 2023-02-26 RX ADMIN — Medication 250 MILLIGRAM(S): at 17:30

## 2023-02-26 RX ADMIN — ENOXAPARIN SODIUM 40 MILLIGRAM(S): 100 INJECTION SUBCUTANEOUS at 05:57

## 2023-02-27 LAB
ANION GAP SERPL CALC-SCNC: 6 MMOL/L — SIGNIFICANT CHANGE UP (ref 5–17)
BUN SERPL-MCNC: 8 MG/DL — SIGNIFICANT CHANGE UP (ref 7–23)
CALCIUM SERPL-MCNC: 8.7 MG/DL — SIGNIFICANT CHANGE UP (ref 8.4–10.5)
CHLORIDE SERPL-SCNC: 106 MMOL/L — SIGNIFICANT CHANGE UP (ref 96–108)
CO2 SERPL-SCNC: 25 MMOL/L — SIGNIFICANT CHANGE UP (ref 22–31)
CREAT SERPL-MCNC: 0.64 MG/DL — SIGNIFICANT CHANGE UP (ref 0.5–1.3)
EGFR: 85 ML/MIN/1.73M2 — SIGNIFICANT CHANGE UP
GLUCOSE SERPL-MCNC: 85 MG/DL — SIGNIFICANT CHANGE UP (ref 70–99)
HCT VFR BLD CALC: 34.2 % — LOW (ref 34.5–45)
HGB BLD-MCNC: 11.3 G/DL — LOW (ref 11.5–15.5)
MAGNESIUM SERPL-MCNC: 1.9 MG/DL — SIGNIFICANT CHANGE UP (ref 1.6–2.6)
MCHC RBC-ENTMCNC: 27.9 PG — SIGNIFICANT CHANGE UP (ref 27–34)
MCHC RBC-ENTMCNC: 33 GM/DL — SIGNIFICANT CHANGE UP (ref 32–36)
MCV RBC AUTO: 84.4 FL — SIGNIFICANT CHANGE UP (ref 80–100)
NRBC # BLD: 0 /100 WBCS — SIGNIFICANT CHANGE UP (ref 0–0)
PLATELET # BLD AUTO: 143 K/UL — LOW (ref 150–400)
POTASSIUM SERPL-MCNC: 4.2 MMOL/L — SIGNIFICANT CHANGE UP (ref 3.5–5.3)
POTASSIUM SERPL-SCNC: 4.2 MMOL/L — SIGNIFICANT CHANGE UP (ref 3.5–5.3)
RBC # BLD: 4.05 M/UL — SIGNIFICANT CHANGE UP (ref 3.8–5.2)
RBC # FLD: 14 % — SIGNIFICANT CHANGE UP (ref 10.3–14.5)
SODIUM SERPL-SCNC: 137 MMOL/L — SIGNIFICANT CHANGE UP (ref 135–145)
WBC # BLD: 5.64 K/UL — SIGNIFICANT CHANGE UP (ref 3.8–10.5)
WBC # FLD AUTO: 5.64 K/UL — SIGNIFICANT CHANGE UP (ref 3.8–10.5)

## 2023-02-27 RX ADMIN — SODIUM CHLORIDE 75 MILLILITER(S): 9 INJECTION, SOLUTION INTRAVENOUS at 05:33

## 2023-02-27 RX ADMIN — Medication 75 MICROGRAM(S): at 05:26

## 2023-02-27 RX ADMIN — ENOXAPARIN SODIUM 40 MILLIGRAM(S): 100 INJECTION SUBCUTANEOUS at 05:27

## 2023-02-27 RX ADMIN — Medication 1 TABLET(S): at 18:37

## 2023-02-27 RX ADMIN — Medication 1 GRAM(S): at 19:54

## 2023-02-27 RX ADMIN — Medication 1 TABLET(S): at 08:16

## 2023-02-27 RX ADMIN — Medication 250 MILLIGRAM(S): at 18:37

## 2023-02-27 RX ADMIN — Medication 250 MILLIGRAM(S): at 05:26

## 2023-02-27 RX ADMIN — CHOLESTYRAMINE 4 GRAM(S): 4 POWDER, FOR SUSPENSION ORAL at 08:13

## 2023-02-27 RX ADMIN — Medication 1 GRAM(S): at 05:26

## 2023-02-27 RX ADMIN — Medication 1 GRAM(S): at 12:27

## 2023-02-27 NOTE — DIETITIAN INITIAL EVALUATION ADULT - ORAL INTAKE PTA/DIET HISTORY
usually 2x meals per day (late morning meal - HB egg, sausage/ham, sometimes orange or banana; evening meal - protein/veg/starch)

## 2023-02-27 NOTE — CAREGIVER ENGAGEMENT NOTE - CAREGIVER EDUCATION NOTES - FREE TEXT
Per discussion w/ inpatient interdisciplinary treatment team this AM, patient is not yet medically cleared for transition to next level of care as of yet.  Sofia & sae  spoke w/ patient's daughter Jess over the phone for discussion of discharge plan. Daughter reported that she is agreeable to sub-acute rehab placement on discharge and requested referral be sent to the [facility] (will send referral upon completion of TLELO). Daughter also requested acute rehab referral be sent to [facility] to see if patient is eligible for acute rehab, therefore  sent referral accordingly. Will continue to follow. Per discussion w/ inpatient interdisciplinary treatment team this AM, patient is not yet medically cleared for transition to next level of care.  Sofia & sae  spoke w/ patient's daughter Jess over the phone for discussion of discharge plan. Daughter reported that she is agreeable to sub-acute rehab placement on discharge and requested referral be sent to the [facility] (will send referral upon completion of TELLO). Daughter also requested acute rehab referral be sent to [facility] to see if patient is eligible for acute rehab, therefore  sent referral accordingly. Will continue to follow.

## 2023-02-27 NOTE — SOCIAL WORK PROGRESS NOTE - NSSWPROGRESSNOTE_GEN_ALL_CORE
Per discussion w/ inpatient interdisciplinary treatment team this AM, patient is not yet medically cleared for transition to next level of care.  Sofia & sae  spoke w/ patient's daughter Jess over the phone for discussion of discharge plan. Daughter reported that she is agreeable to sub-acute rehab placement on discharge and requested referral be sent to the [facility] (will send referral upon completion of TELLO). Daughter also requested acute rehab referral be sent to [facility] to see if patient is eligible for acute rehab, therefore  sent referral accordingly. Will continue to follow.

## 2023-02-27 NOTE — DIETITIAN INITIAL EVALUATION ADULT - PERTINENT LABORATORY DATA
02-27    137  |  106  |  8   ----------------------------<  85  4.2   |  25  |  0.64    Ca    8.7      27 Feb 2023 08:06  Mg     1.9     02-27

## 2023-02-27 NOTE — DIETITIAN INITIAL EVALUATION ADULT - PERTINENT MEDS FT
MEDICATIONS  (STANDING):  cholestyramine Powder (Sugar-Free) 4 Gram(s) Oral daily  enoxaparin Injectable 40 milliGRAM(s) SubCutaneous every 24 hours  lactated ringers. 1000 milliLiter(s) (75 mL/Hr) IV Continuous <Continuous>  lactobacillus acidophilus 1 Tablet(s) Oral two times a day with meals  levothyroxine 75 MICROGram(s) Oral daily  saccharomyces boulardii 250 milliGRAM(s) Oral two times a day  sucralfate suspension 1 Gram(s) Oral every 6 hours    MEDICATIONS  (PRN):  acetaminophen     Tablet .. 650 milliGRAM(s) Oral every 6 hours PRN Temp greater or equal to 38C (100.4F), Mild Pain (1 - 3)  melatonin 3 milliGRAM(s) Oral at bedtime PRN Insomnia  ondansetron Injectable 4 milliGRAM(s) IV Push every 8 hours PRN Nausea and/or Vomiting

## 2023-02-27 NOTE — DIETITIAN INITIAL EVALUATION ADULT - OTHER INFO
Per H&P, pt is a "88 year old female with PMHx of hypothyroidism presented to the ED complaining of 4 days of diarrhea and progressive weakness. States that she has been having nausea and vomiting. Not eating because of this. Only able to tolerate water. She states that she fell earlier on the day of admission, says that she was on the floor for hours until found by daughter. Daughter states that she fell in the morning but does not think she was on the floor for that long. No head trauma or LOC. Was complaining of some right elbow pain earlier but this has now resolved. Has been progressively weaker and lethargic. Daughter states that she has some memory issues but has been a little more confused from baseline. She also mentions that patient had some bad Chinese food before her vomiting started. She was also with her great-grandson, who is now in a different state but later found out that he developed a diarrheal/vomiting illness as well. Patient had a colonoscopy many years ago but not recently - does not have a GI doctor. Denies chest pain, palpitations, dyspnea, headache, dizziness, abdominal pain."    Pt seen for nutrition assessment secondary to GI distress PTA (D/N/V).  Found to have +Rotavirus.  Clear liquids initiated on admission and diet advanced to low fiber/lactose free diet 2/25.  Pt reports decreased po intake PTA secondary to gi complaints, states she continues to have poor appetite.  Discussed meal selections (protein-rich, bland) to optimize gi tolerance; po intake encouraged as tolerated.  Reports no BM x 2 days.  On IVF (LR@75ml/hr) and abx as well as cholestyramine powder and carafate; GI following.  Pt reports she lives at home with daughter in home; daughter who is an RN prepares most of the meals.  Appetite is good at baseline, consumes 2x meals per day (reports she wakes up later).  Prefers traditional [place] food.  No significant weight changes reported, however, pt states she does not weigh herself regularly.  Recommend consider d/c cholestyramine powder secondary to resolution of diarrhea.  RD to follow up and will continue to monitor pt's nutrition status.

## 2023-02-28 ENCOUNTER — TRANSCRIPTION ENCOUNTER (OUTPATIENT)
Age: 88
End: 2023-02-28

## 2023-02-28 DIAGNOSIS — R79.89 OTHER SPECIFIED ABNORMAL FINDINGS OF BLOOD CHEMISTRY: ICD-10-CM

## 2023-02-28 LAB
ALBUMIN SERPL ELPH-MCNC: 2.6 G/DL — LOW (ref 3.3–5)
ALP SERPL-CCNC: 64 U/L — SIGNIFICANT CHANGE UP (ref 30–120)
ALT FLD-CCNC: 45 U/L DA — SIGNIFICANT CHANGE UP (ref 10–60)
ANION GAP SERPL CALC-SCNC: 7 MMOL/L — SIGNIFICANT CHANGE UP (ref 5–17)
AST SERPL-CCNC: 49 U/L — HIGH (ref 10–40)
BASOPHILS # BLD AUTO: 0 K/UL — SIGNIFICANT CHANGE UP (ref 0–0.2)
BASOPHILS NFR BLD AUTO: 0 % — SIGNIFICANT CHANGE UP (ref 0–2)
BILIRUB SERPL-MCNC: 0.5 MG/DL — SIGNIFICANT CHANGE UP (ref 0.2–1.2)
BUN SERPL-MCNC: 9 MG/DL — SIGNIFICANT CHANGE UP (ref 7–23)
CALCIUM SERPL-MCNC: 8.9 MG/DL — SIGNIFICANT CHANGE UP (ref 8.4–10.5)
CHLORIDE SERPL-SCNC: 105 MMOL/L — SIGNIFICANT CHANGE UP (ref 96–108)
CO2 SERPL-SCNC: 26 MMOL/L — SIGNIFICANT CHANGE UP (ref 22–31)
CREAT SERPL-MCNC: 0.63 MG/DL — SIGNIFICANT CHANGE UP (ref 0.5–1.3)
EGFR: 85 ML/MIN/1.73M2 — SIGNIFICANT CHANGE UP
EOSINOPHIL # BLD AUTO: 0.19 K/UL — SIGNIFICANT CHANGE UP (ref 0–0.5)
EOSINOPHIL NFR BLD AUTO: 3 % — SIGNIFICANT CHANGE UP (ref 0–6)
GLUCOSE SERPL-MCNC: 88 MG/DL — SIGNIFICANT CHANGE UP (ref 70–99)
HCT VFR BLD CALC: 35.1 % — SIGNIFICANT CHANGE UP (ref 34.5–45)
HGB BLD-MCNC: 11.8 G/DL — SIGNIFICANT CHANGE UP (ref 11.5–15.5)
LYMPHOCYTES # BLD AUTO: 1.98 K/UL — SIGNIFICANT CHANGE UP (ref 1–3.3)
LYMPHOCYTES # BLD AUTO: 32 % — SIGNIFICANT CHANGE UP (ref 13–44)
MANUAL SMEAR VERIFICATION: YES — SIGNIFICANT CHANGE UP
MCHC RBC-ENTMCNC: 28.2 PG — SIGNIFICANT CHANGE UP (ref 27–34)
MCHC RBC-ENTMCNC: 33.6 GM/DL — SIGNIFICANT CHANGE UP (ref 32–36)
MCV RBC AUTO: 84 FL — SIGNIFICANT CHANGE UP (ref 80–100)
MONOCYTES # BLD AUTO: 0.25 K/UL — SIGNIFICANT CHANGE UP (ref 0–0.9)
MONOCYTES NFR BLD AUTO: 4 % — SIGNIFICANT CHANGE UP (ref 2–14)
NEUTROPHILS # BLD AUTO: 3.47 K/UL — SIGNIFICANT CHANGE UP (ref 1.8–7.4)
NEUTROPHILS NFR BLD AUTO: 53 % — SIGNIFICANT CHANGE UP (ref 43–77)
NEUTS BAND # BLD: 3 % — SIGNIFICANT CHANGE UP (ref 0–8)
NRBC # BLD: 0 — SIGNIFICANT CHANGE UP
NRBC # BLD: SIGNIFICANT CHANGE UP /100 WBCS (ref 0–0)
PLAT MORPH BLD: NORMAL — SIGNIFICANT CHANGE UP
PLATELET # BLD AUTO: 153 K/UL — SIGNIFICANT CHANGE UP (ref 150–400)
PLATELET COUNT - ESTIMATE: NORMAL — SIGNIFICANT CHANGE UP
POTASSIUM SERPL-MCNC: 4.3 MMOL/L — SIGNIFICANT CHANGE UP (ref 3.5–5.3)
POTASSIUM SERPL-SCNC: 4.3 MMOL/L — SIGNIFICANT CHANGE UP (ref 3.5–5.3)
PROT SERPL-MCNC: 5.6 G/DL — LOW (ref 6–8.3)
RBC # BLD: 4.18 M/UL — SIGNIFICANT CHANGE UP (ref 3.8–5.2)
RBC # FLD: 14.1 % — SIGNIFICANT CHANGE UP (ref 10.3–14.5)
RBC BLD AUTO: NORMAL — SIGNIFICANT CHANGE UP
SARS-COV-2 RNA SPEC QL NAA+PROBE: SIGNIFICANT CHANGE UP
SODIUM SERPL-SCNC: 138 MMOL/L — SIGNIFICANT CHANGE UP (ref 135–145)
VARIANT LYMPHS # BLD: 5 % — SIGNIFICANT CHANGE UP (ref 0–6)
WBC # BLD: 6.2 K/UL — SIGNIFICANT CHANGE UP (ref 3.8–10.5)
WBC # FLD AUTO: 6.2 K/UL — SIGNIFICANT CHANGE UP (ref 3.8–10.5)

## 2023-02-28 RX ORDER — LACTOBACILLUS ACIDOPHILUS 100MM CELL
1 CAPSULE ORAL
Qty: 0 | Refills: 0 | DISCHARGE
Start: 2023-02-28

## 2023-02-28 RX ORDER — ENOXAPARIN SODIUM 100 MG/ML
40 INJECTION SUBCUTANEOUS
Qty: 0 | Refills: 0 | DISCHARGE
Start: 2023-02-28

## 2023-02-28 RX ORDER — ACETAMINOPHEN 500 MG
2 TABLET ORAL
Qty: 0 | Refills: 0 | DISCHARGE
Start: 2023-02-28

## 2023-02-28 RX ORDER — LANOLIN ALCOHOL/MO/W.PET/CERES
1 CREAM (GRAM) TOPICAL
Qty: 0 | Refills: 0 | DISCHARGE
Start: 2023-02-28

## 2023-02-28 RX ADMIN — Medication 1 TABLET(S): at 08:02

## 2023-02-28 RX ADMIN — Medication 75 MICROGRAM(S): at 06:28

## 2023-02-28 RX ADMIN — Medication 1 GRAM(S): at 06:28

## 2023-02-28 RX ADMIN — CHOLESTYRAMINE 4 GRAM(S): 4 POWDER, FOR SUSPENSION ORAL at 08:02

## 2023-02-28 RX ADMIN — Medication 250 MILLIGRAM(S): at 06:28

## 2023-02-28 RX ADMIN — ENOXAPARIN SODIUM 40 MILLIGRAM(S): 100 INJECTION SUBCUTANEOUS at 06:27

## 2023-02-28 NOTE — DISCHARGE NOTE NURSING/CASE MANAGEMENT/SOCIAL WORK - PATIENT PORTAL LINK FT
You can access the FollowMyHealth Patient Portal offered by [facility] by registering at the following website: [email or web address]. By joining [employer]’s FollowMyHealth portal, you will also be able to view your health information using other applications (apps) compatible with our system.

## 2023-02-28 NOTE — SOCIAL WORK PROGRESS NOTE - NSSWPROGRESSNOTE_GEN_ALL_CORE
Per discussion w/ inpatient interdisciplinary treatment team this AM, patient is medically cleared for transition to next level of care today, 02/28/2023.  spoke w/ patient's daughter, Mrs. Jess Sen, @ [phone] to discuss discharge plan; daughter stated that she is no longer interested in accepting bed offer from [facility], [facility] in [place], or [facility] in [place], and she instead requested sub-acute rehab referral be sent to [facility] in [place] and [facility] in [place].     sent referral to the above 2 additional choices, and patient was accepted to both, however bed was only available @ [facility].  therefore met w/ patient @ bedside on [address] to discuss the above, and patient stated that she does not want to leave hospital today despite bed being available @ [facility].  provided education regarding discharge planning, as  spoke w/ [facility] in the admissions dept for [facility] who said they cannot hold the open bed for tomorrow, and  also provided 24hr notice for discharge and reviewed Important Message from Medicare (IMM) regarding patient's right to appeal her hospital discharge. Patient stated that she does not intend to appeal her hospital discharge w/in the next 24hr and will hope that [facility] or [facility] has an available bed for her tomorrow.  called patient's daughter back and notified her of patient's decision above.    Also of note, patient requested HIPAA release form to request her medical records;  provided patient w/ such & offered to assist w/ filling out the request form.     to continue to follow.

## 2023-02-28 NOTE — CAREGIVER ENGAGEMENT NOTE - CAREGIVER EDUCATION NOTES - FREE TEXT
Per discussion w/ inpatient interdisciplinary treatment team this AM, patient is medically cleared for transition to next level of care today, 02/28/2023.  spoke w/ patient's daughter, Mrs. Jess Sen, @ [phone] to discuss discharge plan; daughter stated that she is no longer interested in accepting bed offer from [facility], [facility] in [place], or [facility] in [place], and she instead requested sub-acute rehab referral be sent to [facility] in [place] and [facility] in [place].     sent referral to the above 2 additional choices, and patient was accepted to both, however bed was only available @ [facility].  therefore met w/ patient @ bedside on [address] to discuss the above, and patient stated that she does not want to leave hospital today despite bed being available @ [facility].  provided education regarding discharge planning, as  spoke w/ [facility] in the admissions dept for [facility] who said they cannot hold the open bed for tomorrow, and  also provided 24hr notice for discharge and reviewed Important Message from Medicare (IMM) regarding patient's right to appeal her hospital discharge. Patient stated that she does not intend to appeal her hospital discharge w/in the next 24hr and will hope that [facility] or [facility] has an available bed for her tomorrow.  called patient's daughter back and notified her of patient's decision above.    Also of note, patient requested HIPAA release form to request her medical records;  provided patient w/ such & offered to assist w/ filling out the request form.     to continue to follow.

## 2023-02-28 NOTE — DISCHARGE NOTE PROVIDER - NSDCCPCAREPLAN_GEN_ALL_CORE_FT
PRINCIPAL DISCHARGE DIAGNOSIS  Diagnosis: Acute diarrhea  Assessment and Plan of Treatment: rota virus infection resolved      SECONDARY DISCHARGE DIAGNOSES  Diagnosis: Acute diarrhea  Assessment and Plan of Treatment:

## 2023-02-28 NOTE — CONSULT NOTE ADULT - SUBJECTIVE AND OBJECTIVE BOX
Chief Complaint:  Patient is a 88y old  Female who presents with a chief complaint of weakness/diarrhea called to see pt for colitis and for abn imatgoing nodule in the colon had a colonoscopy 15 years ago  History of Present Illness:   88 year old female with PMHx of hypothyroidism presented to the ED complaining of 4 days of diarrhea and progressive weakness. States that she has been having nausea and vomiting. Not eating because of this. Only able to tolerate water. She states that she fell earlier on the day of admission, says that she was on the floor for hours until found by daughter. Daughter states that she fell in the morning but does not think she was on the floor for that long. No head trauma or LOC. Was complaining of some right elbow pain earlier but this has now resolved. Has been progressively weaker and lethargic. Daughter states that she has some memory issues but has been a little more confused from baseline. She also mentions that patient had some bad Chinese food before her vomiting started. She was also with her great-grandson, who is now in a different state but later found out that he developed a diarrheal/vomiting illness as well. Patient had a colonoscopy many years ago but not recently - does not have a GI doctor. Denies chest pain, palpitations, dyspnea, headache, dizziness, abdominal pain.     Review of Systems:  Review of Systems: General: ADMITS fever, weakness, lethargy  Eyes: no vision changes  ENT: no hearing changes, nasal congestion, or sore throat  CV: no chest pain or palpitations  Pulm: no SOB, wheezing, cough, or hemoptysis  Abd/GI: no constipation, abd pain; ADMITS nausea, vomiting, diarrhea,   : no dysuria, hematuria, urinary frequency  MSK: no joint pain or myalgias  Neuro: no syncope, dizziness, focal weakness; ADMITS lethargy  Psych: no anxiety or depression  Endo: no heat or cold intolerance      Allergies:  No Known Allergies      Medications:  acetaminophen     Tablet .. 650 milliGRAM(s) Oral every 6 hours PRN  cholestyramine Powder (Sugar-Free) 4 Gram(s) Oral daily  enoxaparin Injectable 40 milliGRAM(s) SubCutaneous every 24 hours  lactated ringers. 1000 milliLiter(s) IV Continuous <Continuous>  lactobacillus acidophilus 1 Tablet(s) Oral two times a day with meals  levothyroxine 75 MICROGram(s) Oral daily  melatonin 3 milliGRAM(s) Oral at bedtime PRN  ondansetron Injectable 4 milliGRAM(s) IV Push every 8 hours PRN  piperacillin/tazobactam IVPB.. 3.375 Gram(s) IV Intermittent every 8 hours  potassium chloride    Tablet ER 40 milliEquivalent(s) Oral once  saccharomyces boulardii 250 milliGRAM(s) Oral two times a day      PMHX/PSHX:  Hypothyroid    Constipation    H/O:  section        Family history:  FH: diabetes mellitus    FHx: breast cancer (Child)        Social History:   no etoh no cigs lives at home no ivda    ROS:     General:  No wt loss, fevers, chills, night sweats, fatigue,   Eyes:  Good vision, no reported pain  ENT:  No sore throat, pain, runny nose, dysphagia  CV:  No pain, palpitations, hypo/hypertension  Resp:  No dyspnea, cough, tachypnea, wheezing  GI:  No pain, No nausea, No vomiting, No diarrhea, No constipation, No weight loss, No fever, No pruritis, No rectal bleeding, No tarry stools, No dysphagia,  :  No pain, bleeding, incontinence, nocturia  Muscle:  No pain, weakness  Neuro:  No weakness, tingling, memory problems  Psych:  No fatigue, insomnia, mood problems, depression  Endocrine:  No polyuria, polydipsia, cold/heat intolerance  Heme:  No petechiae, ecchymosis, easy bruisability  Skin:  No rash, tattoos, scars, edema      PHYSICAL EXAM:   Vital Signs:  Vital Signs Last 24 Hrs  T(C): 37 (2023 08:26), Max: 38.9 (2023 20:16)  T(F): 98.6 (2023 08:26), Max: 102.1 (2023 20:16)  HR: 93 (2023 08:26) (80 - 94)  BP: 145/64 (2023 08:26) (109/53 - 145/64)  BP(mean): --  RR: 18 (2023 08:26) (15 - 20)  SpO2: 97% (2023 08:26) (94% - 99%)    Parameters below as of 2023 08:26  Patient On (Oxygen Delivery Method): room air      Daily Height in cm: 165.1 (2023 20:16)    Daily     GENERAL:  Appears stated age, well-groomed, well-nourished, no distress  HEENT:  NC/AT,  conjunctivae clear and pink, no thyromegaly, nodules, adenopathy, no JVD, sclera -anicteric  CHEST:  Full & symmetric excursion, no increased effort, breath sounds clear  HEART:  Regular rhythm, S1, S2, no murmur/rub/S3/S4, no abdominal bruit, no edema  ABDOMEN:  Soft, non-tender, non-distended, normoactive bowel sounds,  no masses ,no hepato-splenomegaly, no signs of chronic liver disease  EXTEREMITIES:  no cyanosis,clubbing or edema  SKIN:  No rash/erythema/ecchymoses/petechiae/wounds/abscess/warm/dry  NEURO:  Alert, oriented, no asterixis, no tremor, no encephalopathy    LABS:                        11.6   11.36 )-----------( 151      ( 2023 06:30 )             35.1     02-24    137  |  102  |  18  ----------------------------<  90  3.4<L>   |  25  |  0.94    Ca    8.5      2023 06:30  Phos  3.4     02-24  Mg     2.4     -24    TPro  5.4<L>  /  Alb  2.4<L>  /  TBili  0.7  /  DBili  x   /  AST  34  /  ALT  20  /  AlkPhos  59  02-24    LIVER FUNCTIONS - ( 2023 06:30 )  Alb: 2.4 g/dL / Pro: 5.4 g/dL / ALK PHOS: 59 U/L / ALT: 20 U/L DA / AST: 34 U/L / GGT: x           PT/INR - ( 2023 20:45 )   PT: 15.6 sec;   INR: 1.32 ratio         PTT - ( 2023 20:45 )  PTT:26.9 sec  Urinalysis Basic - ( 2023 23:30 )    Color: Yellow / Appearance: Clear / S.010 / pH: x  Gluc: x / Ketone: Negative  / Bili: Negative / Urobili: Negative mg/dL   Blood: x / Protein: 30 mg/dL / Nitrite: Negative   Leuk Esterase: Negative / RBC: 0-2 /HPF / WBC 0-2   Sq Epi: x / Non Sq Epi: Occasional / Bacteria: Occasional          Imaging:          
  Patient is a 88y old  Female who presents with a chief complaint of weakness/diarrhea (2023 12:54)      Reason For Consult: low tsh    HPI:  88 year old female with PMHx of hypothyroidism presented to the ED complaining of 4 days of diarrhea and progressive weakness. States that she has been having nausea and vomiting. Not eating because of this. Only able to tolerate water. She states that she fell earlier on the day of admission, says that she was on the floor for hours until found by daughter. Daughter states that she fell in the morning but does not think she was on the floor for that long. No head trauma or LOC. Was complaining of some right elbow pain earlier but this has now resolved. Has been progressively weaker and lethargic. Daughter states that she has some memory issues but has been a little more confused from baseline. She also mentions that patient had some bad Chinese food before her vomiting started. She was also with her great-grandson, who is now in a different state but later found out that he developed a diarrheal/vomiting illness as well. Patient had a colonoscopy many years ago but not recently - does not have a GI doctor. Denies chest pain, palpitations, dyspnea, headache, dizziness, abdominal pain. (2023 23:20)      PAST MEDICAL & SURGICAL HISTORY:  Hypothyroid      Constipation      H/O:  section          FAMILY HISTORY:  FH: diabetes mellitus    FHx: breast cancer (Child)          Social History:    MEDICATIONS  (STANDING):  enoxaparin Injectable 40 milliGRAM(s) SubCutaneous every 24 hours  lactobacillus acidophilus 1 Tablet(s) Oral two times a day with meals  levothyroxine 75 MICROGram(s) Oral daily    MEDICATIONS  (PRN):  acetaminophen     Tablet .. 650 milliGRAM(s) Oral every 6 hours PRN Temp greater or equal to 38C (100.4F), Mild Pain (1 - 3)  melatonin 3 milliGRAM(s) Oral at bedtime PRN Insomnia  ondansetron Injectable 4 milliGRAM(s) IV Push every 8 hours PRN Nausea and/or Vomiting        T(C): 36.9 (23 @ 14:25), Max: 36.9 (23 @ 11:58)  HR: 66 (23 @ 14:25) (61 - 74)  BP: 175/74 (02-28-23 @ 14:25) (170/89 - 189/77)  RR: 16 (23 @ 14:25) (15 - 16)  SpO2: 96% (23 @ 14:25) (94% - 98%)  Wt(kg): --    PHYSICAL EXAM:  GENERAL: NAD, well-groomed, well-developed  HEAD:  Atraumatic, Normocephalic  NECK: Supple, No JVD, Normal thyroid  CHEST/LUNG: Clear to percussion bilaterally; No rales, rhonchi, wheezing, or rubs  HEART: Regular rate and rhythm; No murmurs, rubs, or gallops  ABDOMEN: Soft, Nontender, Nondistended; Bowel sounds present  EXTREMITIES:  2+ Peripheral Pulses, No clubbing, cyanosis, or edema  SKIN: No rashes or lesions    CAPILLARY BLOOD GLUCOSE                                11.8   6.20  )-----------( 153      ( 2023 08:13 )             35.1       CMP:   @ 08:13  SGPT 45  Albumin 2.6   Alk Phos 64   Anion Gap 7   SGOT 49   Total Bili 0.5   BUN 9   Calcium Total 8.9   CO2 26   Chloride 105   Creatinine 0.63   eGFR if AA --   eGFR if non AA --   Glucose 88   Potassium 4.3   Protein 5.6   Sodium 138      Thyroid Function Tests:      Diabetes Tests:       Radiology:               
J[facility], Division of Infectious Diseases  BRYAN Wilkins S. Shah, Y. Patel, [facility]  [phone]    JUWAN MORRISONAN  88y, Female  [id number]    HPI--  HPI:  88 year old female with PMHx of hypothyroidism presented to the ED complaining of 4 days of diarrhea and progressive weakness. States that she has been having nausea and vomiting. Not eating because of this. Only able to tolerate water. She states that she fell earlier on the day of admission, says that she was on the floor for hours until found by daughter. Daughter states that she fell in the morning but does not think she was on the floor for that long. No head trauma or LOC. Was complaining of some right elbow pain earlier but this has now resolved. Has been progressively weaker and lethargic. Daughter states that she has some memory issues but has been a little more confused from baseline. She also mentions that patient had some bad Chinese food before her vomiting started. She was also with her great-grandson, who is now in a different state but later found out that he developed a diarrheal/vomiting illness as well. Patient had a colonoscopy many years ago but not recently - does not have a GI doctor. Denies chest pain, palpitations, dyspnea, headache, dizziness, abdominal pain. (2023 23:20)    Pt seen at bedside  Daughter present  Pt reporting eating 2 day old Chinese food prior to incident  May have been febrile at home, but did not check temp  No previous episodes  No recent travel  Originally from [place], came ~40 yrs ago w/o recent visit      Active Medications--  acetaminophen     Tablet .. 650 milliGRAM(s) Oral every 6 hours PRN  cholestyramine Powder (Sugar-Free) 4 Gram(s) Oral daily  enoxaparin Injectable 40 milliGRAM(s) SubCutaneous every 24 hours  lactated ringers. 1000 milliLiter(s) IV Continuous <Continuous>  lactobacillus acidophilus 1 Tablet(s) Oral two times a day with meals  levothyroxine 75 MICROGram(s) Oral daily  melatonin 3 milliGRAM(s) Oral at bedtime PRN  ondansetron Injectable 4 milliGRAM(s) IV Push every 8 hours PRN  piperacillin/tazobactam IVPB.. 3.375 Gram(s) IV Intermittent every 8 hours  saccharomyces boulardii 250 milliGRAM(s) Oral two times a day    Antimicrobials:   piperacillin/tazobactam IVPB.. 3.375 Gram(s) IV Intermittent every 8 hours    Immunologic:     ROS:  CONSTITUTIONAL: No fevers or chills. No weakness or headache. No weight changes.  EYES/ENT: No visual or hearing changes. No sore throat or throat pain .  NECK: No pain or stiffness  RESPIRATORY: No cough, wheezing, or hemoptysis. No shortness of breath  CARDIOVASCULAR: No chest pain or palpitations  GASTROINTESTINAL: No abdominal pain. No nausea or vomiting. No diarrhea or constipation.  GENITOURINARY: No dysuria, frequency or hematuria  NEUROLOGICAL: No numbness or weakness  SKIN: No itching or rashes  PSYCHIATRIC: Pleasant. Appropriate affect    Allergies: No Known Allergies    PMH -- Hypothyroid    Constipation      PSH -- H/O:  section      FH -- FH: diabetes mellitus    FHx: breast cancer (Child)      Social History --  EtOH: denies   Tobacco: denies   Drug Use: denies     Travel/Environmental/Occupational History:    Physical Exam--  Vital Signs Last 24 Hrs  T(F): 98.6 (2023 08:26), Max: 102.1 (2023 20:16)  HR: 93 (2023 08:26) (80 - 94)  BP: 145/64 (2023 08:26) (109/53 - 145/64)  RR: 18 (2023 08:26) (15 - 20)  SpO2: 97% (2023 08:26) (94% - 99%)  General: nontoxic-appearing, no acute distress  HEENT: NC/AT, EOMI  Lungs: Clear bilaterally without rales, wheezing or rhonchi  Heart: Regular rate and rhythm. No murmur, rub or gallop.  Abdomen: Soft. Nondistended. Nontender.  Extremities: No cyanosis or clubbing. No edema.   Skin: Warm. Dry. Good turgor.     Laboratory & Imaging Data:  CBC:                       11.6   11.36 )-----------( 151      ( 2023 06:30 )             35.1     CMP:     137  |  102  |  18  ----------------------------<  90  3.4<L>   |  25  |  0.94    Ca    8.5      2023 06:30  Phos  3.4     02-24  Mg     2.4     02-24    TPro  5.4<L>  /  Alb  2.4<L>  /  TBili  0.7  /  DBili  x   /  AST  34  /  ALT  20  /  AlkPhos  59  02-24    LIVER FUNCTIONS - ( 2023 06:30 )  Alb: 2.4 g/dL / Pro: 5.4 g/dL / ALK PHOS: 59 U/L / ALT: 20 U/L DA / AST: 34 U/L / GGT: x           Urinalysis Basic - ( 2023 23:30 )    Color: Yellow / Appearance: Clear / S.010 / pH: x  Gluc: x / Ketone: Negative  / Bili: Negative / Urobili: Negative mg/dL   Blood: x / Protein: 30 mg/dL / Nitrite: Negative   Leuk Esterase: Negative / RBC: 0-2 /HPF / WBC 0-2   Sq Epi: x / Non Sq Epi: Occasional / Bacteria: Occasional        Microbiology: reviewed        Radiology: reviewed    < from: CT Abdomen and Pelvis w/ IV Cont (23 @ 22:21) >    ACC: [id number] EXAM:  CT ABDOMEN AND PELVIS IC   ORDERED BY: REYNA BAILEY     ACC: [id number] EXAM:  CT CHEST IC   ORDERED BY: REYNA BAILEY     PROCEDURE DATE:  2023          INTERPRETATION:  CLINICAL INFORMATION: Fever of unknown origin, sepsis   workup    COMPARISON: None.    CONTRAST/COMPLICATIONS:  IV Contrast: Omnipaque 350    90 cc administered   10 cc discarded  Oral Contrast: NONE  Complications: None reported at time of study completion    PROCEDURE:  CT of the Chest, Abdomen and Pelvis was performed.  Sagittal and coronal reformats were performed.    FINDINGS:  CHEST:  LUNGS AND LARGE AIRWAYS: Clear lungs.  PLEURA: No pleural effusion.  VESSELS: Pulmonary arteries are opacified to the proximal segmental   level,no acute thromboembolus. Main pulmonary artery is normal in   caliber. Atherosclerotic change of the thoracic aorta without aneurysm or   dissection. Coronary artery calcifications.  HEART: Heart size is normal. Trace pericardial effusion.  MEDIASTINUM AND MARIANNE: No lymphadenopathy.  CHEST WALL AND LOWER NECK: 2.3 cm nodule in the medial aspect of the left   breast.    ABDOMEN AND PELVIS:  LIVER: Within normal limits.  BILE DUCTS: Normal caliber.  GALLBLADDER: Cholelithiasis.  SPLEEN: Within normal limits.  PANCREAS: Fatty atrophy  ADRENALS: Within normal limits.  KIDNEYS/URETERS: 5 mm fat-containing lesion in the lower pole right   kidney, represents an angiomyolipoma. Other incidental cysts.    BLADDER: Incompletely distended, unremarkable CT appearance  REPRODUCTIVE ORGANS: Unremarkable CT appearance for age    BOWEL: No bowel obstruction. Normal appendix. 8 mm hyperdense mural based   nodule in the ascending colon best seen on axial series 5, image 129 and   coronal series 7, image 43.Fluid-filled colon without abnormal mural   thickening in the distended portion.  PERITONEUM: No ascites.  VESSELS: Atherosclerotic changes.  RETROPERITONEUM/LYMPH NODES: No lymphadenopathy.  ABDOMINAL WALL: Rectus diastases with small fat-containing umbilical   hernia  BONES: Chronic T11 compression fracture.    IMPRESSION:    1. Findings are compatible with a nonspecific diarrheal illness.  2. Indeterminate left breast nodule located in the inner quadrant, near   the anterior margin of the left fourth rib. Follow-up at a dedicated   breast imaging Center advised.  3. Apparent 8 mm enhancing nodule in the ascending colon. Follow-up   colonoscopy suggested.        --- End of Report ---            MYNOR STEELE MD; Attending Radiologist  This document has been electronically signed. 2023 10:58PM    < end of copied text >  < from: CT Cervical Spine No Cont (23 @ 21:56) >    ACC: [id number] EXAM:  CT BRAIN   ORDERED BY: REYNA BAILEY     ACC: [id number] EXAM:  CT CERVICAL SPINE   ORDERED BY: REYNA BAILEY     PROCEDURE DATE:  2023          INTERPRETATION:  INDICATION: Trauma    COMPARISON: None available    TECHNIQUE: Thin section noncontrast axial images were obtained through   the head and cervical spine. Multiplanar reformats submitted.    FINDINGS:  CT head:    Mild to moderate chronic microvascular ischemic change and cerebral   atrophy, concordant with the patient's age. Gray-white differentiation is   preserved. The ventricles and sulci are normal in size and configuration.   The basilar cisterns are clear. No focal edema or acute mass effect.   There is no intracranial fluid collection or acute hemorrhage.    There is no fracture identified. The visualized paranasal sinuses have   minimal mucosal thickening and the mastoid air cells are clear. Scalp and   imaged facial soft tissues are within normal limits.    CT cervical spine:    Spinal alignment is anatomic. Craniocervical junction is normal. No acute   fracture. Vertebral body heights are maintained. Normal bone   mineralization. Multilevel degenerative change, most prominent at C4-5   where disc osteophyte complex narrows the central canal to 6 mm.    No gross upper cervical canal hematoma or mass. Cervical soft tissues are   unremarkable.      IMPRESSION:  1. No acute intracranial CT abnormality.  2. No acute cervical spine fracture or traumatic malalignment.    --- End of Report ---            MYNOR STEELE MD; Attending Radiologist  This document has been electronically signed. 2023 10:25PM    < end of copied text >  < from: Xray Elbow AP + Lateral + Oblique, Right (23 @ 21:41) >    ACC: [id number] EXAM:  XR ELBOW COMP MIN 3V RT   ORDERED BY: REYNA BAILEY     PROCEDURE DATE:  2023          INTERPRETATION:  Radiographs of the RIGHT elbow    CLINICAL INFORMATION: Injury with  Pain.    TECHNIQUE:  Frontal, oblique and lateral views of the elbow.    FINDINGS:   No prior examinations are available for review.    The osseous structures of the elbow are intact, without fracture or   destructive lesion.   No joint effusion or loose body.    No soft tissue abnormality or radiopaque foreign body.    IMPRESSION:   No acute radiographic osseous pathology..    --- End of Report ---            TOOTIE CONTI MD; Attending Radiologist  This document has been electronically signed. 2023  9:51PM    < end of copied text >  < from: Xray Chest 1 View- PORTABLE-Urgent (23 @ 21:41) >    ACC: [id number] EXAM:  XR CHEST PORTABLE URGENT 1V   ORDERED BY: REYNA BAILEY     PROCEDURE DATE:  2023          INTERPRETATION:  INDICATION: Sepsis    Portable chest 9:38 PM    COMPARISON: 2015    FINDINGS:  Heart/Vascular: The heart size, mediastinum, hilum and aorta are within   normal limits for projection.  Pulmonary: Midline trachea. There is no focal infiltrate, congestion or   effusion.    Bones: There is no fracture.  Lines and catheter: None    Impression:    No acutepulmonary disease.    --- End of Report ---             JORGE TAVARES DO; Attending Radiologist  This document has been electronically signed. 2023  9:21AM    < end of copied text >

## 2023-02-28 NOTE — DISCHARGE NOTE PROVIDER - NSDCMRMEDTOKEN_GEN_ALL_CORE_FT
acetaminophen 325 mg oral tablet: 2 tab(s) orally every 6 hours, As needed, Temp greater or equal to 38C (100.4F), Mild Pain (1 - 3)  enoxaparin: 40 milligram(s) subcutaneous once a day  lactobacillus acidophilus oral capsule: 1 tab(s) orally 2 times a day  levothyroxine 75 mcg (0.075 mg) oral tablet: 1 tab(s) orally once a day  melatonin 3 mg oral tablet: 1 tab(s) orally once a day (at bedtime), As needed, Insomnia

## 2023-02-28 NOTE — DISCHARGE NOTE PROVIDER - HOSPITAL COURSE
88 year old female with PMHx of hypothyroidism admitted with sepsis secondary to acute diarrheal illness.    #Acute diarrheal illness, +rotavirus on GI PCR  - CT reviewed - c/w diarrheal illness, also with possible 8mm nodule in ascending colon  - Will need eventual colonoscopy - outpatient  - Sepsis POA (leukocytosis, HR >90, fever, intra-abdominal source)  - Ate Chinese food and had sick contact   - f/u stool culture - negative thus far - final pending  - C-diff PCR  not detected  - Continue LR at 75cc/hr   - 2/25  +rotavirus, advance diet to low residue lactose free diet, can resume usual diet once sx improve further, decreasing diarrhea frequency and quantity, zosyn DCed  - 2/26  encouraged participation with PT, will keep IVF going at 75cc/hr given generalized weakness and still decreased PO intake    #Sepsis  - as above  - with some lethargy and occasional confusion as per daughter  - CT head negative, likely metabolic encephalopathy  #hypothyroid- cont levothyroxin  #Weakness  - likely due to acute illness and diarrheal losses  - continue IV fluids and replete lytes as needed  - hypomagnesemia treated with IV mag sulfate  - monitor electrolytes  - hypokalemia - supplemented  - PT consult reviewed - home vs. [place] depending on progress    #Fall at home  -  XR of right elbow negative for fracture  - No head trauma - CT negative  - Patient states she was on the floor for hours - check CK - mild elevation at 250  - Fall precautions    #Abnormal imaging  - left breast nodule and possible ascending colon nodule noted on CT imaging  - discussed findings with daughter who is an RN and has a hx of left breast CA  - to get breast imaging and colonoscopy as an outpatient    #Hypothyroidism  - continue synthroid  - TSH suppressed -> Free T4 wnl at 1.3    #Need for prophylactic measure  - VTE prophylaxis: subcutaneous enoxaparin    2/26 AM - discussed plan with dtr over phone  continue current care  unstable gait   DC plan to rehab   IV fluids stopped   bacid one tab tid  low residue lactose free diet  supportive care

## 2023-03-01 VITALS
SYSTOLIC BLOOD PRESSURE: 146 MMHG | TEMPERATURE: 98 F | HEART RATE: 68 BPM | RESPIRATION RATE: 18 BRPM | OXYGEN SATURATION: 95 % | DIASTOLIC BLOOD PRESSURE: 68 MMHG

## 2023-03-01 LAB
CULTURE RESULTS: SIGNIFICANT CHANGE UP
CULTURE RESULTS: SIGNIFICANT CHANGE UP
SPECIMEN SOURCE: SIGNIFICANT CHANGE UP
SPECIMEN SOURCE: SIGNIFICANT CHANGE UP
T3FREE SERPL-MCNC: 1.09 PG/ML — LOW (ref 2–4.4)
T4 FREE SERPL-MCNC: 1.2 NG/DL — SIGNIFICANT CHANGE UP (ref 0.9–1.8)
TSH SERPL-MCNC: 2.2 UIU/ML — SIGNIFICANT CHANGE UP (ref 0.27–4.2)

## 2023-03-01 PROCEDURE — 82378 CARCINOEMBRYONIC ANTIGEN: CPT

## 2023-03-01 PROCEDURE — 73080 X-RAY EXAM OF ELBOW: CPT

## 2023-03-01 PROCEDURE — 85027 COMPLETE CBC AUTOMATED: CPT

## 2023-03-01 PROCEDURE — 97161 PT EVAL LOW COMPLEX 20 MIN: CPT

## 2023-03-01 PROCEDURE — 87040 BLOOD CULTURE FOR BACTERIA: CPT

## 2023-03-01 PROCEDURE — 87507 IADNA-DNA/RNA PROBE TQ 12-25: CPT

## 2023-03-01 PROCEDURE — 74177 CT ABD & PELVIS W/CONTRAST: CPT | Mod: MA

## 2023-03-01 PROCEDURE — 85730 THROMBOPLASTIN TIME PARTIAL: CPT

## 2023-03-01 PROCEDURE — 71045 X-RAY EXAM CHEST 1 VIEW: CPT

## 2023-03-01 PROCEDURE — 84443 ASSAY THYROID STIM HORMONE: CPT

## 2023-03-01 PROCEDURE — 84481 FREE ASSAY (FT-3): CPT

## 2023-03-01 PROCEDURE — 72125 CT NECK SPINE W/O DYE: CPT | Mod: MA

## 2023-03-01 PROCEDURE — 80048 BASIC METABOLIC PNL TOTAL CA: CPT

## 2023-03-01 PROCEDURE — 87046 STOOL CULTR AEROBIC BACT EA: CPT

## 2023-03-01 PROCEDURE — 97116 GAIT TRAINING THERAPY: CPT

## 2023-03-01 PROCEDURE — 36415 COLL VENOUS BLD VENIPUNCTURE: CPT

## 2023-03-01 PROCEDURE — 96375 TX/PRO/DX INJ NEW DRUG ADDON: CPT

## 2023-03-01 PROCEDURE — 81001 URINALYSIS AUTO W/SCOPE: CPT

## 2023-03-01 PROCEDURE — 87086 URINE CULTURE/COLONY COUNT: CPT

## 2023-03-01 PROCEDURE — 87493 C DIFF AMPLIFIED PROBE: CPT

## 2023-03-01 PROCEDURE — 83735 ASSAY OF MAGNESIUM: CPT

## 2023-03-01 PROCEDURE — 85610 PROTHROMBIN TIME: CPT

## 2023-03-01 PROCEDURE — 71260 CT THORAX DX C+: CPT | Mod: MA

## 2023-03-01 PROCEDURE — 84439 ASSAY OF FREE THYROXINE: CPT

## 2023-03-01 PROCEDURE — 84145 PROCALCITONIN (PCT): CPT

## 2023-03-01 PROCEDURE — 87635 SARS-COV-2 COVID-19 AMP PRB: CPT

## 2023-03-01 PROCEDURE — 80053 COMPREHEN METABOLIC PANEL: CPT

## 2023-03-01 PROCEDURE — 87045 FECES CULTURE AEROBIC BACT: CPT

## 2023-03-01 PROCEDURE — 83605 ASSAY OF LACTIC ACID: CPT

## 2023-03-01 PROCEDURE — 85025 COMPLETE CBC W/AUTO DIFF WBC: CPT

## 2023-03-01 PROCEDURE — 99285 EMERGENCY DEPT VISIT HI MDM: CPT

## 2023-03-01 PROCEDURE — 93005 ELECTROCARDIOGRAM TRACING: CPT

## 2023-03-01 PROCEDURE — 82550 ASSAY OF CK (CPK): CPT

## 2023-03-01 PROCEDURE — 84100 ASSAY OF PHOSPHORUS: CPT

## 2023-03-01 PROCEDURE — 97530 THERAPEUTIC ACTIVITIES: CPT

## 2023-03-01 PROCEDURE — 96374 THER/PROPH/DIAG INJ IV PUSH: CPT

## 2023-03-01 PROCEDURE — 70450 CT HEAD/BRAIN W/O DYE: CPT | Mod: MA

## 2023-03-01 PROCEDURE — 97110 THERAPEUTIC EXERCISES: CPT

## 2023-03-01 RX ADMIN — Medication 1 TABLET(S): at 08:23

## 2023-03-01 RX ADMIN — ENOXAPARIN SODIUM 40 MILLIGRAM(S): 100 INJECTION SUBCUTANEOUS at 05:02

## 2023-03-01 RX ADMIN — Medication 75 MICROGRAM(S): at 05:02

## 2023-03-01 NOTE — PROGRESS NOTE ADULT - REASON FOR ADMISSION
weakness/diarrhea

## 2023-03-01 NOTE — PROGRESS NOTE ADULT - ASSESSMENT
88 year old female with PMHx of hypothyroidism admitted with sepsis secondary to acute diarrheal illness.    #Acute diarrheal illness  - Admit to medicine  - CT reviewed - c/w diarrheal illness, also with possible 8mm nodule in ascending colon  - Will need eventual colonoscopy - outpatient  - Sepsis POA (leukocytosis, HR >90, fever, intra-abdominal source)  - Ate Chinese food and had sick contact - f/u GI PCR and stool culture  - C-diff PCR sent but no apparent risk factors (although daughter is an RN)  - Continue intravenous piperacillin-tazobactam  - Continue LR at 75cc/hr  - Continue CLD for now - advance as tolerated  - Follow up blood, urine, stool cultures  - GI consult Dr. Cameron Wood  - ID consult Dr. Michelle Guzman  - 2/24 - c diff specimen sent, awaiting specimen for GI viral panel and stool culture    #Sepsis  - follow culture data and continue antibiotics as above  - with some lethargy and occasional confusion as per daughter  - CT head negative, likely metabolic encephalopathy    #Weakness  - likely due to acute illness and diarrheal losses  - continue IV fluids and replete lytes as needed  - hypomagnesemia treated with IV mag sulfate  - monitor electrolytes  - PT consult    #Fall at home  -  XR of right elbow negative for fracture  - No head trauma - CT negative  - Patient states she was on the floor for hours - check CK  - Fall precautions    #Abnormal imaging  - left breast nodule and possible ascending colon nodule noted on CT imaging  - discussed findings with daughter who is an RN and has a hx of left breast CA  - to get breast imaging and colonoscopy as an outpatient    #Hypothyroidism  - continue synthroid  - check TSH    #Need for prophylactic measure  - VTE prophylaxis: subcutaneous enoxaparin
88 year old female with PMHx of hypothyroidism admitted with sepsis secondary to acute diarrheal illness.    #Acute diarrheal illness, +rotavirus on GI PCR  - CT reviewed - c/w diarrheal illness, also with possible 8mm nodule in ascending colon  - Will need eventual colonoscopy - outpatient  - Sepsis POA (leukocytosis, HR >90, fever, intra-abdominal source)  - Ate Chinese food and had sick contact   - f/u stool culture - negative thus far - final pending  - C-diff PCR  not detected  - Continue LR at 75cc/hr   - 2/25  +rotavirus, advance diet to low residue lactose free diet, can resume usual diet once sx improve further, decreasing diarrhea frequency and quantity, zosyn DCed  - 2/26  encouraged participation with PT, will keep IVF going at 75cc/hr given generalized weakness and still decreased PO intake    #Sepsis  - as above  - with some lethargy and occasional confusion as per daughter  - CT head negative, likely metabolic encephalopathy    #Weakness  - likely due to acute illness and diarrheal losses  - continue IV fluids and replete lytes as needed  - hypomagnesemia treated with IV mag sulfate  - monitor electrolytes  - hypokalemia - supplemented  - PT consult reviewed - home vs. [facility] depending on progress    #Fall at home  -  XR of right elbow negative for fracture  - No head trauma - CT negative  - Patient states she was on the floor for hours - check CK - mild elevation at 250  - Fall precautions    #Abnormal imaging  - left breast nodule and possible ascending colon nodule noted on CT imaging  - discussed findings with daughter who is an RN and has a hx of left breast CA  - to get breast imaging and colonoscopy as an outpatient    #Hypothyroidism  - continue synthroid  - TSH suppressed -> Free T4 wnl at 1.3    #Need for prophylactic measure  - VTE prophylaxis: subcutaneous enoxaparin    2/26 AM - discussed plan with dtr over phone  continue current care  unstable gait   DC plan to rehab   IV fluids stopped   bacid one tab tid  low residue lactose free diet  supportive care
abn imaging  diarrhea  food poisoning    plan  gi pcr noted  bacid one tab tid  low residue lactose free diet  supportive care  replete electrolytes as needed   will follow    will need outpt colonosopcy to be done for ascending colon nodule
88 year old female with PMHx of hypothyroidism admitted with sepsis secondary to acute diarrheal illness.    #Acute diarrheal illness, +rotavirus on PCP panel  - CT reviewed - c/w diarrheal illness, also with possible 8mm nodule in ascending colon  - Will need eventual colonoscopy - outpatient  - Sepsis POA (leukocytosis, HR >90, fever, intra-abdominal source)  - Ate Chinese food and had sick contact   - f/u stool culture - negative thus far - final pending  - C-diff PCR  not detected  - Continue LR at 75cc/hr   - 2/25  +rotavirus, advance diet to low residue lactose free diet, can resume usual diet once sx improve further, decreasing diarrhea frequency and quantity, zosyn DCed  - 2/26  encouraged participation with PT, will keep IVF going at 75cc/hr given generalized weakness and still decreased PO intake    #Sepsis  - as above  - with some lethargy and occasional confusion as per daughter  - CT head negative, likely metabolic encephalopathy    #Weakness  - likely due to acute illness and diarrheal losses  - continue IV fluids and replete lytes as needed  - hypomagnesemia treated with IV mag sulfate  - monitor electrolytes  - hypokalemia - supplemented  - PT consult reviewed - home vs. [facility] depending on progress    #Fall at home  -  XR of right elbow negative for fracture  - No head trauma - CT negative  - Patient states she was on the floor for hours - check CK - mild elevation at 250  - Fall precautions    #Abnormal imaging  - left breast nodule and possible ascending colon nodule noted on CT imaging  - discussed findings with daughter who is an RN and has a hx of left breast CA  - to get breast imaging and colonoscopy as an outpatient    #Hypothyroidism  - continue synthroid  - TSH suppressed -> Free T4 wnl at 1.3    #Need for prophylactic measure  - VTE prophylaxis: subcutaneous enoxaparin    2/26 AM - discussed plan with dtr over phone  continue current care
88 year old female with PMHx of hypothyroidism admitted with sepsis secondary to acute diarrheal illness.    #Acute diarrheal illness, +rotavirus on PCP panel  - CT reviewed - c/w diarrheal illness, also with possible 8mm nodule in ascending colon  - Will need eventual colonoscopy - outpatient  - Sepsis POA (leukocytosis, HR >90, fever, intra-abdominal source)  - Ate Chinese food and had sick contact - f/u stool culture  - C-diff PCR sent but no apparent risk factors (although daughter is an RN) - not detected  - Continue intravenous piperacillin-tazobactam for now, ID f/u later today  - Continue LR at 75cc/hr  - 2/25 - advance diet to low residue lactose free diet, can resume usual diet once sx improve further, decreasing diarrhea frequency and quantity    #Sepsis  - follow culture data and continue antibiotics as above  - with some lethargy and occasional confusion as per daughter  - CT head negative, likely metabolic encephalopathy    #Weakness  - likely due to acute illness and diarrheal losses  - continue IV fluids and replete lytes as needed  - hypomagnesemia treated with IV mag sulfate  - monitor electrolytes  - PT consult reviewed - home vs. [facility] depending on progress    #Fall at home  -  XR of right elbow negative for fracture  - No head trauma - CT negative  - Patient states she was on the floor for hours - check CK - mild elevation at 250  - Fall precautions    #Abnormal imaging  - left breast nodule and possible ascending colon nodule noted on CT imaging  - discussed findings with daughter who is an RN and has a hx of left breast CA  - to get breast imaging and colonoscopy as an outpatient    #Hypothyroidism  - continue synthroid  - TSH suppressed, check Free T4    #Need for prophylactic measure  - VTE prophylaxis: subcutaneous enoxaparin
88 year old female with PMHx of hypothyroidism admitted with sepsis secondary to acute diarrheal illness.    Sepsis 2/2 Gatroenteritis  Acute Diarrhea  - pt p/w fever, tachycardia and leukocytosis- resolved    Plan:   C Diff negative  GI PCR positive for rotavirus  contact precautions  supportive care, IVFs as needed    clinically  improved  diet as tolerated    dc planning 
88 year old female with PMHx of hypothyroidism admitted with sepsis secondary to acute diarrheal illness.    Sepsis 2/2 Gatroenteritis  Acute Diarrhea  C Diff negative  GI PCR positive for rotavirus  contact precautions  supportive care and IVFs as needed    Stable from ID standpoint  D/c planning per primary team    Infectious Diseases will continue to follow. Please call with any questions.   Michelle Guzman M.D.  [facility] Division of Infectious Diseases [phone]  
abn imaging  diarrhea  food poisoning    plan  gi pcr noted  bacid one tab tid  low residue lactose free diet  supportive care  replete electrolytes as needed   will follow    will need outpt colonosopcy to be done for ascending colon nodule
88 year old female with PMHx of hypothyroidism admitted with sepsis secondary to acute diarrheal illness.    Sepsis 2/2 Gatroenteritis  Acute Diarrhea  - pt p/w fever, tachycardia and leukocytosis   - reporting eating 2 day old Chinese food  Plan:   C Diff negative  GI PCR positive for rotavirus  contact precautions  discontinue zosyn  supportive care, IVFs as needed    Jaime Springer M.D.  [facility], Division of Infectious Diseases  [phone]  After 5pm on weekdays and all day on weekends - please call [phone] 
88 year old female with PMHx of hypothyroidism admitted with sepsis secondary to acute diarrheal illness.    #Acute diarrheal illness, +rotavirus on GI PCR  - CT reviewed - c/w diarrheal illness, also with possible 8mm nodule in ascending colon  - Will need eventual colonoscopy - outpatient  - Sepsis POA (leukocytosis, HR >90, fever, intra-abdominal source)  - Ate Chinese food and had sick contact   - f/u stool culture - negative thus far - final pending  - C-diff PCR  not detected  - Continue LR at 75cc/hr   - 2/25  +rotavirus, advance diet to low residue lactose free diet, can resume usual diet once sx improve further, decreasing diarrhea frequency and quantity, zosyn DCed  - 2/26  encouraged participation with PT, will keep IVF going at 75cc/hr given generalized weakness and still decreased PO intake    #Sepsis  - as above  - with some lethargy and occasional confusion as per daughter  - CT head negative, likely metabolic encephalopathy    #Weakness  - likely due to acute illness and diarrheal losses  - continue IV fluids and replete lytes as needed  - hypomagnesemia treated with IV mag sulfate  - monitor electrolytes  - hypokalemia - supplemented  - PT consult reviewed - home vs. [facility] depending on progress    #Fall at home  -  XR of right elbow negative for fracture  - No head trauma - CT negative  - Patient states she was on the floor for hours - check CK - mild elevation at 250  - Fall precautions    #Abnormal imaging  - left breast nodule and possible ascending colon nodule noted on CT imaging  - discussed findings with daughter who is an RN and has a hx of left breast CA  - to get breast imaging and colonoscopy as an outpatient    #Hypothyroidism  - continue synthroid  - TSH suppressed -> Free T4 wnl at 1.3  endo consult noted    #Need for prophylactic measure  - VTE prophylaxis: subcutaneous enoxaparin    2/28 AM - discussed plan with dtr over phone  continue current care  unstable gait   DC plan to rehab   IV fluids stopped   bacid one tab tid  low residue lactose free diet  supportive care
88 year old female with PMHx of hypothyroidism admitted with sepsis secondary to acute diarrheal illness.    #Acute diarrheal illness, +rotavirus on PCP panel  - CT reviewed - c/w diarrheal illness, also with possible 8mm nodule in ascending colon  - Will need eventual colonoscopy - outpatient  - Sepsis POA (leukocytosis, HR >90, fever, intra-abdominal source)  - Ate Chinese food and had sick contact   - f/u stool culture - negative thus far - final pending  - C-diff PCR  not detected  - Continue LR at 75cc/hr   - 2/25  +rotavirus, advance diet to low residue lactose free diet, can resume usual diet once sx improve further, decreasing diarrhea frequency and quantity, zosyn DCed  - 2/26  encouraged participation with PT, will keep IVF going at 75cc/hr given generalized weakness and still decreased PO intake    #Sepsis  - as above  - with some lethargy and occasional confusion as per daughter  - CT head negative, likely metabolic encephalopathy    #Weakness  - likely due to acute illness and diarrheal losses  - continue IV fluids and replete lytes as needed  - hypomagnesemia treated with IV mag sulfate  - monitor electrolytes  - hypokalemia - supplemented  - PT consult reviewed - home vs. [facility] depending on progress    #Fall at home  -  XR of right elbow negative for fracture  - No head trauma - CT negative  - Patient states she was on the floor for hours - check CK - mild elevation at 250  - Fall precautions    #Abnormal imaging  - left breast nodule and possible ascending colon nodule noted on CT imaging  - discussed findings with daughter who is an RN and has a hx of left breast CA  - to get breast imaging and colonoscopy as an outpatient    #Hypothyroidism  - continue synthroid  - TSH suppressed -> Free T4 wnl at 1.3    #Need for prophylactic measure  - VTE prophylaxis: subcutaneous enoxaparin    2/26 AM - discussed plan with dtr over phone
88 year old female with PMHx of hypothyroidism admitted with sepsis secondary to acute diarrheal illness.    Sepsis 2/2 Gatroenteritis  Acute Diarrhea  - pt p/w fever, tachycardia and leukocytosis- resolved  - reporting eating 2 day old Chinese food  Plan:   C Diff negative  GI PCR positive for rotavirus  contact precautions  supportive care, IVFs as needed    Jaime Springer M.D.  [facility], Division of Infectious Diseases  [phone]  After 5pm on weekdays and all day on weekends - please call [phone] 
88 year old female with PMHx of hypothyroidism admitted with sepsis secondary to acute diarrheal illness.    Sepsis 2/2 Gatroenteritis  Acute Diarrhea  C Diff negative  GI PCR positive for rotavirus  contact precautions  supportive care and IVFs as needed    Infectious Diseases will continue to follow. Please call with any questions.   Michelle Guzman M.D.  [facility] Division of Infectious Diseases [phone]  
abn imaging  diarrhea  food poisoning    plan  gi pcr noted  bacid one tab tid  low residue lactose free diet  supportive care  replete electrolytes as needed   will follow    will need outpt colonosopcy to be done for ascending colon nodule

## 2023-03-01 NOTE — SOCIAL WORK PROGRESS NOTE - NSSWPROGRESSNOTE_GEN_ALL_CORE
Per discussion w/ inpatient interdisciplinary treatment team this AM, patient remains medically cleared for transition to next level of care today, 03/01/2023.  spoke w/ Cara in the admissions dept for patient's preferred sub-acute rehab facility, [facility] in [place], who confirmed that patient has been accepted w/ a start of care date/time for today, 03/01/2023, @ 13:30. Afterward,  met w/ patient @ bedside on [facility] who reported to be understanding of and agreeable to the above discharge plan. Next,  spoke w/ patient's daughter, Mrs. Jess Sen, @ [phone] who also confirmed to be understanding of and agreeable to the above discharge plan.  then requested ambulette for discharge transport via sending electronic referral to [facility] -- trip assigned to transport provider [email or web address] @ [phone], and daughter agreeable to call in credit card payment for such.    Attending MD Darnell & unit RN Wander both aware. Packet of clinical information left in RN station on unit to accompany patient to receiving facility.  to remain available for any continued needs.

## 2023-03-01 NOTE — PROGRESS NOTE ADULT - SUBJECTIVE AND OBJECTIVE BOX
[facility]  Dallin Cardona PA-C  [address] [place] [address]  [phone]      INTERVAL HPI/OVERNIGHT EVENTS:    GI pcr positive for rotavirus  no diarrhea    MEDICATIONS  (STANDING):  cholestyramine Powder (Sugar-Free) 4 Gram(s) Oral daily  enoxaparin Injectable 40 milliGRAM(s) SubCutaneous every 24 hours  lactated ringers. 1000 milliLiter(s) (75 mL/Hr) IV Continuous <Continuous>  lactobacillus acidophilus 1 Tablet(s) Oral two times a day with meals  levothyroxine 75 MICROGram(s) Oral daily  piperacillin/tazobactam IVPB.. 3.375 Gram(s) IV Intermittent every 8 hours  saccharomyces boulardii 250 milliGRAM(s) Oral two times a day    MEDICATIONS  (PRN):  acetaminophen     Tablet .. 650 milliGRAM(s) Oral every 6 hours PRN Temp greater or equal to 38C (100.4F), Mild Pain (1 - 3)  melatonin 3 milliGRAM(s) Oral at bedtime PRN Insomnia  ondansetron Injectable 4 milliGRAM(s) IV Push every 8 hours PRN Nausea and/or Vomiting      Allergies    No Known Allergies    Intolerances        ROS:   General:  No  fevers, chills, night sweats, fatigue,   Eyes:  Good vision, no reported pain  ENT:  No sore throat, pain, runny nose, dysphagia  CV:  No pain, palpitations, hypo/hypertension  Resp:  No dyspnea, cough, tachypnea, wheezing  GI:  No pain, No nausea, No vomiting, + diarrhea, No constipation, No weight loss, No fever, No pruritis, No rectal bleeding, No tarry stools, No dysphagia,  :  No pain, bleeding, incontinence, nocturia  Muscle:  No pain, weakness  Neuro:  No weakness, tingling, memory problems  Psych:  No fatigue, insomnia, mood problems, depression  Endocrine:  No polyuria, polydipsia, cold/heat intolerance  Heme:  No petechiae, ecchymosis, easy bruisability  Skin:  No rash, tattoos, scars, edema      PHYSICAL EXAM:   Vital Signs:  Vital Signs Last 24 Hrs  T(C): 36.9 (2023 05:00), Max: 37 (2023 14:39)  T(F): 98.5 (2023 05:00), Max: 98.6 (2023 14:39)  HR: 70 (2023 05:00) (70 - 78)  BP: 138/64 (2023 05:00) (115/61 - 138/64)  BP(mean): --  RR: 18 (2023 05:00) (18 - 18)  SpO2: 95% (2023 05:00) (94% - 95%)    Parameters below as of 2023 05:00  Patient On (Oxygen Delivery Method): room air      Daily     Daily     GENERAL:  Appears stated age,   HEENT:  NC/AT,    CHEST:  Full & symmetric excursion,   HEART:  Regular rhythm,  ABDOMEN:  Soft, non-tender, non-distended,  EXTEREMITIES:  no cyanosis  SKIN:  No rash  NEURO:  Alert,       LABS:                        11.4   6.09  )-----------( 142      ( 2023 06:00 )             36.5     02-25    136  |  106  |  14  ----------------------------<  86  4.2   |  22  |  0.60    Ca    8.6      2023 06:00  Phos  2.8     02-25  Mg     2.2     02-25    TPro  5.4<L>  /  Alb  2.4<L>  /  TBili  0.7  /  DBili  x   /  AST  34  /  ALT  20  /  AlkPhos  59  02-24    PT/INR - ( 2023 20:45 )   PT: 15.6 sec;   INR: 1.32 ratio         PTT - ( 2023 20:45 )  PTT:26.9 sec  Urinalysis Basic - ( 2023 23:30 )    Color: Yellow / Appearance: Clear / S.010 / pH: x  Gluc: x / Ketone: Negative  / Bili: Negative / Urobili: Negative mg/dL   Blood: x / Protein: 30 mg/dL / Nitrite: Negative   Leuk Esterase: Negative / RBC: 0-2 /HPF / WBC 0-2   Sq Epi: x / Non Sq Epi: Occasional / Bacteria: Occasional        RADIOLOGY & ADDITIONAL TESTS:  
[facility], Division of Infectious Diseases  BRYAN Wilkins Y. Patel, S. Shah, G. Gian  [phone]  ([phone] - weekdays after 5pm and weekends)    Name: MARCELO MORRISON  Age/Gender: 88y Female  MRN: [id number]    Interval History:  Notes reviewed.   No concerning overnight events.  Afebrile.   family at bedside states she does not like the food she has been given    Allergies: No Known Allergies      Objective:  Vitals:   T(F): 98.2 (02-26-23 @ 14:06), Max: 98.2 (02-26-23 @ 14:06)  HR: 69 (02-26-23 @ 14:06) (64 - 69)  BP: 161/67 (02-26-23 @ 14:06) (152/69 - 161/67)  RR: 19 (02-26-23 @ 14:06) (18 - 19)  SpO2: 95% (02-26-23 @ 14:06) (95% - 95%)  Physical Examination:  General: no acute distress  HEENT: anicteric  Cardio: normal rate  Resp: breathing comfortably on RA  Abd: soft, NT, ND  Ext: no LE edema  Skin: warm, dry    Laboratory Studies:  CBC:                       11.4   5.65  )-----------( 143      ( 26 Feb 2023 06:00 )             34.9     WBC Trend:  5.65 02-26-23 @ 06:00  6.09 02-25-23 @ 06:00  11.36 02-24-23 @ 06:30  17.31 02-23-23 @ 20:45    CMP: 02-26    138  |  108  |  14  ----------------------------<  87  3.4<L>   |  26  |  0.70    Ca    8.2<L>      26 Feb 2023 06:00  Phos  2.8     02-25  Mg     2.2     02-25              Microbiology: reviewed     Culture - Stool (collected 02-24-23 @ 11:00)  Source: .Stool Feces  Final Report (02-26-23 @ 16:05):    No enteric pathogens isolated.    (Stool culture examined for Salmonella,    Shigella, Campylobacter, Aeromonas, Plesiomonas,    Vibrio, E.coli O157 and Yersinia)    Culture - Urine (collected 02-23-23 @ 23:30)  Source: Clean Catch Clean Catch (Midstream)  Final Report (02-25-23 @ 14:23):    No growth    Culture - Blood (collected 02-23-23 @ 20:46)  Source: .Blood Blood-Peripheral  Preliminary Report (02-25-23 @ 13:01):    No growth to date.    Culture - Blood (collected 02-23-23 @ 20:46)  Source: .Blood Blood-Peripheral  Preliminary Report (02-25-23 @ 13:01):    No growth to date.        Radiology: reviewed     Medications:  acetaminophen     Tablet .. 650 milliGRAM(s) Oral every 6 hours PRN  cholestyramine Powder (Sugar-Free) 4 Gram(s) Oral daily  enoxaparin Injectable 40 milliGRAM(s) SubCutaneous every 24 hours  lactated ringers. 1000 milliLiter(s) IV Continuous <Continuous>  lactobacillus acidophilus 1 Tablet(s) Oral two times a day with meals  levothyroxine 75 MICROGram(s) Oral daily  melatonin 3 milliGRAM(s) Oral at bedtime PRN  ondansetron Injectable 4 milliGRAM(s) IV Push every 8 hours PRN  saccharomyces boulardii 250 milliGRAM(s) Oral two times a day    Antimicrobials:  
[facility], Division of Infectious Diseases  BRYAN Wilkins Y. Patel, S. Shah, G. Gian  [phone]  ([phone] - weekdays after 5pm and weekends)    Name: MARCELO MORRISON  Age/Gender: 88y Female  MRN: [id number]    Interval History:  Notes reviewed.   No concerning overnight events.  Afebrile.   pt reports diarrhea improving  daughter at bedside    Allergies: No Known Allergies      Objective:  Vitals:   T(F): 98.5 (23 @ 05:00), Max: 98.5 (23 @ 05:00)  HR: 70 (23 @ 05:00) (70 - 70)  BP: 138/64 (23 @ 05:00) (138/64 - 138/64)  RR: 18 (23 @ 05:00) (18 - 18)  SpO2: 95% (23 @ 05:00) (95% - 95%)  Physical Examination:  General: no acute distress  HEENT: anicteric  Cardio: S1, S2, normal rate  Resp: clear to auscultation bilaterally  Abd: soft, NT, ND  Ext: no LE edema  Skin: warm, dry    Laboratory Studies:  CBC:                       11.4   6.09  )-----------( 142      ( 2023 06:00 )             36.5     WBC Trend:  6.09 23 @ 06:00  11.36 23 @ 06:30  17.31 23 @ 20:45    CMP:     136  |  106  |  14  ----------------------------<  86  4.2   |  22  |  0.60    Ca    8.6      2023 06:00  Phos  2.8       Mg     2.2         TPro  5.4<L>  /  Alb  2.4<L>  /  TBili  0.7  /  DBili  x   /  AST  34  /  ALT  20  /  AlkPhos  59  24      LIVER FUNCTIONS - ( 2023 06:30 )  Alb: 2.4 g/dL / Pro: 5.4 g/dL / ALK PHOS: 59 U/L / ALT: 20 U/L DA / AST: 34 U/L / GGT: x             Urinalysis Basic - ( 2023 23:30 )    Color: Yellow / Appearance: Clear / S.010 / pH: x  Gluc: x / Ketone: Negative  / Bili: Negative / Urobili: Negative mg/dL   Blood: x / Protein: 30 mg/dL / Nitrite: Negative   Leuk Esterase: Negative / RBC: 0-2 /HPF / WBC 0-2   Sq Epi: x / Non Sq Epi: Occasional / Bacteria: Occasional      Microbiology: reviewed     Culture - Urine (collected 23 @ 23:30)  Source: Clean Catch Clean Catch (Midstream)  Final Report (23 @ 14:23):    No growth    Culture - Blood (collected 23 @ 20:46)  Source: .Blood Blood-Peripheral  Preliminary Report (23 @ 13:01):    No growth to date.    Culture - Blood (collected 23 @ 20:46)  Source: .Blood Blood-Peripheral  Preliminary Report (23 @ 13:01):    No growth to date.        Radiology: reviewed     Medications:  acetaminophen     Tablet .. 650 milliGRAM(s) Oral every 6 hours PRN  cholestyramine Powder (Sugar-Free) 4 Gram(s) Oral daily  enoxaparin Injectable 40 milliGRAM(s) SubCutaneous every 24 hours  lactated ringers. 1000 milliLiter(s) IV Continuous <Continuous>  lactobacillus acidophilus 1 Tablet(s) Oral two times a day with meals  levothyroxine 75 MICROGram(s) Oral daily  melatonin 3 milliGRAM(s) Oral at bedtime PRN  ondansetron Injectable 4 milliGRAM(s) IV Push every 8 hours PRN  saccharomyces boulardii 250 milliGRAM(s) Oral two times a day    Antimicrobials:  
[facility], Division of Infectious Diseases  BRYAN Wilkins Y. Patel, S. Shah, G. Gian  [phone]  after hours and weekends [phone]    Name: MARCELO MORRISON  Age: 88y  Gender: Female  MRN: [id number]    Interval History--  Notes reviewed  her only complaint is that she feels bloated      Allergies    No Known Allergies    Intolerances        Medications--  Antibiotics:    Immunologic:    Other:  acetaminophen     Tablet .. PRN  cholestyramine Powder (Sugar-Free)  enoxaparin Injectable  lactated ringers.  lactobacillus acidophilus  levothyroxine  melatonin PRN  ondansetron Injectable PRN  saccharomyces boulardii  sucralfate suspension      Review of Systems--  A 10-point review of systems was obtained.     Pertinent positives and negatives--  Constitutional: No fevers. No Chills. No Rigors.   Cardiovascular: No chest pain. No palpitations.  Respiratory: No shortness of breath. No cough.  Gastrointestinal: No nausea or vomiting. No diarrhea or constipation.   Psychiatric: Pleasant. Appropriate affect.    Review of systems otherwise negative except as previously noted.    Physical Examination--  Vital Signs: T(F): 98.3 (02-27-23 @ 15:01), Max: 98.4 (02-26-23 @ 21:35)  HR: 60 (02-27-23 @ 15:01)  BP: 150/68 (02-27-23 @ 15:01)  RR: 16 (02-27-23 @ 15:01)  SpO2: 96% (02-27-23 @ 15:01)  Wt(kg): --  General: Nontoxic-appearing Female in no acute distress.  HEENT: AT/NC  Neck: Not rigid. No sense of mass.  Nodes: None palpable.  Lungs: Clear bilaterally without rales, wheezing or rhonchi  Heart: Regular rate and rhythm. No Murmur.  Abdomen: Bowel sounds present and normoactive. Soft. distended. Nontender.   Back: No spinal tenderness. No costovertebral angle tenderness.   Extremities: No cyanosis or clubbing. No edema.   Skin: Warm. Dry. Good turgor. No rash. No vasculitic stigmata.  Psychiatric: Appropriate affect and mood for situation.         Laboratory Studies--  CBC                        11.3   5.64  )-----------( 143      ( 27 Feb 2023 08:06 )             34.2       Chemistries  02-27    137  |  106  |  8   ----------------------------<  85  4.2   |  25  |  0.64    Ca    8.7      27 Feb 2023 08:06  Mg     1.9     02-27        Culture Data    Culture - Stool (collected 24 Feb 2023 11:00)  Source: .Stool Feces  Final Report (26 Feb 2023 16:05):    No enteric pathogens isolated.    (Stool culture examined for Salmonella,    Shigella, Campylobacter, Aeromonas, Plesiomonas,    Vibrio, E.coli O157 and Yersinia)    Culture - Urine (collected 23 Feb 2023 23:30)  Source: Clean Catch Clean Catch (Midstream)  Final Report (25 Feb 2023 14:23):    No growth    Culture - Blood (collected 23 Feb 2023 20:46)  Source: .Blood Blood-Peripheral  Preliminary Report (25 Feb 2023 13:01):    No growth to date.    Culture - Blood (collected 23 Feb 2023 20:46)  Source: .Blood Blood-Peripheral  Preliminary Report (25 Feb 2023 13:01):    No growth to date.            
Patient is a 88y old  Female who presents with a chief complaint of weakness/diarrhea (28 Feb 2023 18:35)      INTERVAL HPI/OVERNIGHT EVENTS:no ac event overnight    Home Medications:  acetaminophen 325 mg oral tablet: 2 tab(s) orally every 6 hours, As needed, Temp greater or equal to 38C (100.4F), Mild Pain (1 - 3) (28 Feb 2023 11:32)  enoxaparin: 40 milligram(s) subcutaneous once a day (28 Feb 2023 11:32)  lactobacillus acidophilus oral capsule: 1 tab(s) orally 2 times a day (28 Feb 2023 11:32)  levothyroxine 75 mcg (0.075 mg) oral tablet: 1 tab(s) orally once a day (24 Feb 2023 01:14)  melatonin 3 mg oral tablet: 1 tab(s) orally once a day (at bedtime), As needed, Insomnia (28 Feb 2023 11:32)      MEDICATIONS  (STANDING):  enoxaparin Injectable 40 milliGRAM(s) SubCutaneous every 24 hours  lactobacillus acidophilus 1 Tablet(s) Oral two times a day with meals  levothyroxine 75 MICROGram(s) Oral daily    MEDICATIONS  (PRN):  acetaminophen     Tablet .. 650 milliGRAM(s) Oral every 6 hours PRN Temp greater or equal to 38C (100.4F), Mild Pain (1 - 3)  melatonin 3 milliGRAM(s) Oral at bedtime PRN Insomnia  ondansetron Injectable 4 milliGRAM(s) IV Push every 8 hours PRN Nausea and/or Vomiting      Allergies    No Known Allergies    Intolerances        REVIEW OF SYSTEMS:  CONSTITUTIONAL: No fever, weight loss, or fatigue  EYES: No eye pain, visual disturbances, or discharge  ENMT:  No difficulty hearing, tinnitus, vertigo; No sinus or throat pain  NECK: No pain or stiffness  BREASTS: No pain, masses, or nipple discharge  RESPIRATORY: No cough, wheezing, chills or hemoptysis; No shortness of breath  CARDIOVASCULAR: No chest pain, palpitations, dizziness, or leg swelling  GASTROINTESTINAL: No abdominal or epigastric pain. No nausea, vomiting, no loose stools, has constipation. No melena or hematochezia.  GENITOURINARY: No dysuria, frequency, hematuria, or incontinence  NEUROLOGICAL: No headaches, memory loss, loss of strength, numbness, or tremors  SKIN: No itching, burning, rashes, or lesions   LYMPH NODES: No enlarged glands  ENDOCRINE: No heat or cold intolerance; No hair loss  MUSCULOSKELETAL: No joint pain or swelling; No muscle, back, or extremity pain  PSYCHIATRIC: No depression, anxiety, mood swings, or difficulty sleeping  HEME/LYMPH: No easy bruising, or bleeding gums  ALLERGY AND IMMUNOLOGIC: No hives or eczema    Vital Signs Last 24 Hrs  T(C): 37 (01 Mar 2023 04:55), Max: 37.2 (28 Feb 2023 22:12)  T(F): 98.6 (01 Mar 2023 04:55), Max: 98.9 (28 Feb 2023 22:12)  HR: 70 (01 Mar 2023 04:55) (66 - 74)  BP: 158/71 (01 Mar 2023 04:55) (158/71 - 189/77)  BP(mean): --  RR: 17 (01 Mar 2023 04:55) (16 - 17)  SpO2: 93% (01 Mar 2023 04:55) (93% - 96%)    Parameters below as of 01 Mar 2023 04:55  Patient On (Oxygen Delivery Method): room air        PHYSICAL EXAM:  GENERAL: NAD, well-groomed, well-developed  HEAD:  Atraumatic, Normocephalic  EYES: EOMI, PERRLA, conjunctiva and sclera clear  ENMT: Moist mucous membranes,   NECK: Supple, No JVD, Normal thyroid  NERVOUS SYSTEM:  Alert & Oriented X3, non focal  CHEST/LUNG: Clear to percussion bilaterally; No rales, rhonchi, wheezing, or rubs  HEART: Regular rate and rhythm; No murmurs, rubs, or gallops  ABDOMEN: Soft, Nontender, Nondistended; Bowel sounds present  EXTREMITIES:  2+ Peripheral Pulses, No clubbing, cyanosis, or edema  LYMPH: No lymphadenopathy noted  SKIN: No rashes or lesions    LABS:                        11.8   6.20  )-----------( 153      ( 28 Feb 2023 08:13 )             35.1     02-28    138  |  105  |  9   ----------------------------<  88  4.3   |  26  |  0.63    Ca    8.9      28 Feb 2023 08:13    TPro  5.6<L>  /  Alb  2.6<L>  /  TBili  0.5  /  DBili  x   /  AST  49<H>  /  ALT  45  /  AlkPhos  64  02-28        CAPILLARY BLOOD GLUCOSE            Culture - Stool (collected 02-24-23 @ 11:00)  Source: .Stool Feces  Final Report (02-26-23 @ 16:05):    No enteric pathogens isolated.    (Stool culture examined for Salmonella,    Shigella, Campylobacter, Aeromonas, Plesiomonas,    Vibrio, E.coli O157 and Yersinia)    Culture - Urine (collected 02-23-23 @ 23:30)  Source: Clean Catch Clean Catch (Midstream)  Final Report (02-25-23 @ 14:23):    No growth    Culture - Blood (collected 02-23-23 @ 20:46)  Source: .Blood Blood-Peripheral  Preliminary Report (02-25-23 @ 13:01):    No growth to date.    Culture - Blood (collected 02-23-23 @ 20:46)  Source: .Blood Blood-Peripheral  Preliminary Report (02-25-23 @ 13:01):    No growth to date.        I&O's Summary      RADIOLOGY & ADDITIONAL TESTS:    Imaging Personally Reviewed:  [ x] YES  [ ] NO    Consultant(s) Notes Reviewed:  [ x] YES  [ ] NO    Care Discussed with Consultants/Other Providers [x ] YES  [ ] NO
[facility], Division of Infectious Diseases  BRYAN Wilkins Y. Patel, S. Shah, G. [facility]  [phone]    Name: MARCELO MORRISON  Age: 88y  Gender: Female  MRN: [id number]    Interval History:  Patient seen and examined at bedside  No acute overnight events. Afebrile  Notes reviewed    Antibiotics:      Medications:  acetaminophen     Tablet .. 650 milliGRAM(s) Oral every 6 hours PRN  enoxaparin Injectable 40 milliGRAM(s) SubCutaneous every 24 hours  lactobacillus acidophilus 1 Tablet(s) Oral two times a day with meals  levothyroxine 75 MICROGram(s) Oral daily  melatonin 3 milliGRAM(s) Oral at bedtime PRN  ondansetron Injectable 4 milliGRAM(s) IV Push every 8 hours PRN      Review of Systems:  Review of systems otherwise negative except as previously noted.    Allergies: No Known Allergies    For details regarding the patient's past medical history, social history, family history, and other miscellaneous elements, please refer the initial infectious diseases consultation and/or the admitting history and physical examination for this admission.    Objective:  Vitals:   T(C): 36.7 (03-01-23 @ 11:15), Max: 37.2 (02-28-23 @ 22:12)  HR: 67 (03-01-23 @ 11:15) (66 - 72)  BP: 151/62 (03-01-23 @ 11:15) (151/62 - 188/68)  RR: 18 (03-01-23 @ 11:15) (16 - 18)  SpO2: 95% (03-01-23 @ 11:15) (93% - 96%)    Physical Examination:  General: no acute distress  HEENT: NC/AT, EOMI,  Cardio: S1, S2 heard, RRR, no murmurs  Resp: breath sounds heard bilaterally  Abd: soft, NT, ND,  Ext: no edema or cyanosis  Skin: warm, dry, no visible rash      Laboratory Studies:  CBC:                       11.8   6.20  )-----------( 153      ( 28 Feb 2023 08:13 )             35.1     CMP: 02-28    138  |  105  |  9   ----------------------------<  88  4.3   |  26  |  0.63    Ca    8.9      28 Feb 2023 08:13    TPro  5.6<L>  /  Alb  2.6<L>  /  TBili  0.5  /  DBili  x   /  AST  49<H>  /  ALT  45  /  AlkPhos  64  02-28    LIVER FUNCTIONS - ( 28 Feb 2023 08:13 )  Alb: 2.6 g/dL / Pro: 5.6 g/dL / ALK PHOS: 64 U/L / ALT: 45 U/L DA / AST: 49 U/L / GGT: x               Microbiology: reviewed    Culture - Stool (collected 02-24-23 @ 11:00)  Source: .Stool Feces  Final Report (02-26-23 @ 16:05):    No enteric pathogens isolated.    (Stool culture examined for Salmonella,    Shigella, Campylobacter, Aeromonas, Plesiomonas,    Vibrio, E.coli O157 and Yersinia)    Culture - Urine (collected 02-23-23 @ 23:30)  Source: Clean Catch Clean Catch (Midstream)  Final Report (02-25-23 @ 14:23):    No growth    Culture - Blood (collected 02-23-23 @ 20:46)  Source: .Blood Blood-Peripheral  Preliminary Report (02-25-23 @ 13:01):    No growth to date.    Culture - Blood (collected 02-23-23 @ 20:46)  Source: .Blood Blood-Peripheral  Preliminary Report (02-25-23 @ 13:01):    No growth to date.          Radiology: reviewed      
[facility], Division of Infectious Diseases  BRYAN Wilkins Y. Patel, S. Shah, G. [facility]  [phone]    Name: MARCELO MORRISON  Age: 88y  Gender: Female  MRN: [id number]    Interval History:  Patient seen and examined at bedside  No acute overnight events. Afebrile  No complaints  Notes reviewed    Antibiotics:      Medications:  acetaminophen     Tablet .. 650 milliGRAM(s) Oral every 6 hours PRN  enoxaparin Injectable 40 milliGRAM(s) SubCutaneous every 24 hours  lactobacillus acidophilus 1 Tablet(s) Oral two times a day with meals  levothyroxine 75 MICROGram(s) Oral daily  melatonin 3 milliGRAM(s) Oral at bedtime PRN  ondansetron Injectable 4 milliGRAM(s) IV Push every 8 hours PRN      Review of Systems:  Review of systems otherwise negative except as previously noted.    Allergies: No Known Allergies    For details regarding the patient's past medical history, social history, family history, and other miscellaneous elements, please refer the initial infectious diseases consultation and/or the admitting history and physical examination for this admission.    Objective:  Vitals:   T(C): 36.9 (02-28-23 @ 11:58), Max: 36.9 (02-28-23 @ 11:58)  HR: 74 (02-28-23 @ 11:58) (60 - 74)  BP: 189/77 (02-28-23 @ 11:58) (150/68 - 189/77)  RR: 16 (02-28-23 @ 11:58) (15 - 16)  SpO2: 94% (02-28-23 @ 11:58) (94% - 98%)    Physical Examination:  General: no acute distress  HEENT: NC/AT, EOMI,  Cardio: S1, S2 heard, RRR, no murmurs  Resp: decreased breath sounds  Abd: soft, NT, ND  Ext: no edema or cyanosis  Skin: warm, dry, no visible rash      Laboratory Studies:  CBC:                       11.8   6.20  )-----------( 153      ( 28 Feb 2023 08:13 )             35.1     CMP: 02-28    138  |  105  |  9   ----------------------------<  88  4.3   |  26  |  0.63    Ca    8.9      28 Feb 2023 08:13  Mg     1.9     02-27    TPro  5.6<L>  /  Alb  2.6<L>  /  TBili  0.5  /  DBili  x   /  AST  49<H>  /  ALT  45  /  AlkPhos  64  02-28    LIVER FUNCTIONS - ( 28 Feb 2023 08:13 )  Alb: 2.6 g/dL / Pro: 5.6 g/dL / ALK PHOS: 64 U/L / ALT: 45 U/L DA / AST: 49 U/L / GGT: x               Microbiology: reviewed    Culture - Stool (collected 02-24-23 @ 11:00)  Source: .Stool Feces  Final Report (02-26-23 @ 16:05):    No enteric pathogens isolated.    (Stool culture examined for Salmonella,    Shigella, Campylobacter, Aeromonas, Plesiomonas,    Vibrio, E.coli O157 and Yersinia)    Culture - Urine (collected 02-23-23 @ 23:30)  Source: Clean Catch Clean Catch (Midstream)  Final Report (02-25-23 @ 14:23):    No growth    Culture - Blood (collected 02-23-23 @ 20:46)  Source: .Blood Blood-Peripheral  Preliminary Report (02-25-23 @ 13:01):    No growth to date.    Culture - Blood (collected 02-23-23 @ 20:46)  Source: .Blood Blood-Peripheral  Preliminary Report (02-25-23 @ 13:01):    No growth to date.          Radiology: reviewed      
INTERVAL HPI/OVERNIGHT EVENTS:   Patient seen and examined.  No diarrhea overnight.  Patient notes generalized weakness, does not want PT to come by today.    REVIEW OF SYSTEMS:  See HPI,  all others negative    PHYSICAL EXAM:  Vital Signs Last 24 Hrs  T(C): 36.7 (2023 05:00), Max: 36.7 (2023 18:46)  T(F): 98 (2023 05:00), Max: 98 (2023 18:46)  HR: 64 (2023 05:00) (64 - 69)  BP: 152/69 (2023 05:00) (129/72 - 152/69)  BP(mean): --  RR: 18 (2023 05:00) (18 - 18)  SpO2: 95% (2023 05:00) (95% - 97%)    Parameters below as of 2023 05:00  Patient On (Oxygen Delivery Method): room air    GENERAL: NAD, well-groomed, well-developed, awake, alert, oriented x to person and place, fluent and coherent speech,sitting up in bed about to start breakfast  NECK: Supple, No JVD, No Cervical LAD  NERVOUS SYSTEM:  Moving all 4 extremities against gravity and resistance; No gross sensory deficits, No facial droop  CHEST/LUNG: Clear to auscultation bilaterally with good air entry; No rales, rhonchi, wheezing, or rubs  HEART: Regular rate and rhythm; No murmurs, rubs, or gallops  ABDOMEN: Soft, Nontender, Nondistended, Bowel sounds present, No palpable masses or organomegaly, No bruits  EXTREMITIES:  2+ Peripheral Pulses, No clubbing, cyanosis, or edema, no calf tenderness in either leg    Diagnostic Testin.4   5.65  )-----------( 143      ( 2023 06:00 )             34.9     02-    138  |  108  |  14  ----------------------------<  87  3.4<L>   |  26  |  0.70    Ca    8.2<L>      2023 06:00  Phos  2.8       Mg     2.2                    
[facility]  Dallin Cardona PA-C  [address] [place] [address]  [phone]      INTERVAL HPI/OVERNIGHT EVENTS:    GI pcr positive for rotavirus    MEDICATIONS  (STANDING):  cholestyramine Powder (Sugar-Free) 4 Gram(s) Oral daily  enoxaparin Injectable 40 milliGRAM(s) SubCutaneous every 24 hours  lactated ringers. 1000 milliLiter(s) (75 mL/Hr) IV Continuous <Continuous>  lactobacillus acidophilus 1 Tablet(s) Oral two times a day with meals  levothyroxine 75 MICROGram(s) Oral daily  piperacillin/tazobactam IVPB.. 3.375 Gram(s) IV Intermittent every 8 hours  saccharomyces boulardii 250 milliGRAM(s) Oral two times a day    MEDICATIONS  (PRN):  acetaminophen     Tablet .. 650 milliGRAM(s) Oral every 6 hours PRN Temp greater or equal to 38C (100.4F), Mild Pain (1 - 3)  melatonin 3 milliGRAM(s) Oral at bedtime PRN Insomnia  ondansetron Injectable 4 milliGRAM(s) IV Push every 8 hours PRN Nausea and/or Vomiting      Allergies    No Known Allergies    Intolerances        ROS:   General:  No  fevers, chills, night sweats, fatigue,   Eyes:  Good vision, no reported pain  ENT:  No sore throat, pain, runny nose, dysphagia  CV:  No pain, palpitations, hypo/hypertension  Resp:  No dyspnea, cough, tachypnea, wheezing  GI:  No pain, No nausea, No vomiting, + diarrhea, No constipation, No weight loss, No fever, No pruritis, No rectal bleeding, No tarry stools, No dysphagia,  :  No pain, bleeding, incontinence, nocturia  Muscle:  No pain, weakness  Neuro:  No weakness, tingling, memory problems  Psych:  No fatigue, insomnia, mood problems, depression  Endocrine:  No polyuria, polydipsia, cold/heat intolerance  Heme:  No petechiae, ecchymosis, easy bruisability  Skin:  No rash, tattoos, scars, edema      PHYSICAL EXAM:   Vital Signs:  Vital Signs Last 24 Hrs  T(C): 36.9 (2023 05:00), Max: 37 (2023 14:39)  T(F): 98.5 (2023 05:00), Max: 98.6 (2023 14:39)  HR: 70 (2023 05:00) (70 - 78)  BP: 138/64 (2023 05:00) (115/61 - 138/64)  BP(mean): --  RR: 18 (2023 05:00) (18 - 18)  SpO2: 95% (2023 05:00) (94% - 95%)    Parameters below as of 2023 05:00  Patient On (Oxygen Delivery Method): room air      Daily     Daily     GENERAL:  Appears stated age,   HEENT:  NC/AT,    CHEST:  Full & symmetric excursion,   HEART:  Regular rhythm,  ABDOMEN:  Soft, non-tender, non-distended,  EXTEREMITIES:  no cyanosis  SKIN:  No rash  NEURO:  Alert,       LABS:                        11.4   6.09  )-----------( 142      ( 2023 06:00 )             36.5     02-25    136  |  106  |  14  ----------------------------<  86  4.2   |  22  |  0.60    Ca    8.6      2023 06:00  Phos  2.8     02-25  Mg     2.2     02-25    TPro  5.4<L>  /  Alb  2.4<L>  /  TBili  0.7  /  DBili  x   /  AST  34  /  ALT  20  /  AlkPhos  59  02-24    PT/INR - ( 2023 20:45 )   PT: 15.6 sec;   INR: 1.32 ratio         PTT - ( 2023 20:45 )  PTT:26.9 sec  Urinalysis Basic - ( 2023 23:30 )    Color: Yellow / Appearance: Clear / S.010 / pH: x  Gluc: x / Ketone: Negative  / Bili: Negative / Urobili: Negative mg/dL   Blood: x / Protein: 30 mg/dL / Nitrite: Negative   Leuk Esterase: Negative / RBC: 0-2 /HPF / WBC 0-2   Sq Epi: x / Non Sq Epi: Occasional / Bacteria: Occasional        RADIOLOGY & ADDITIONAL TESTS:  
INTERVAL HPI/OVERNIGHT EVENTS:   Patient seen and examined.  2 episodes of diarrhea overnight.    REVIEW OF SYSTEMS:  See HPI,  all others negative    PHYSICAL EXAM:  Vital Signs Last 24 Hrs  T(C): 36.9 (2023 05:00), Max: 37 (2023 14:39)  T(F): 98.5 (2023 05:00), Max: 98.6 (2023 14:39)  HR: 70 (2023 05:00) (70 - 78)  BP: 138/64 (2023 05:00) (115/61 - 138/64)  BP(mean): --  RR: 18 (2023 05:00) (18 - 18)  SpO2: 95% (:00) (94% - 95%)    Parameters below as of 2023 05:00  Patient On (Oxygen Delivery Method): room air    GENERAL: NAD, well-groomed, well-developed, awake, alert, oriented x to person and place, fluent and coherent speech  NECK: Supple, No JVD, No Cervical LAD  NERVOUS SYSTEM:  Moving all 4 extremities against gravity and resistance; No gross sensory deficits, No facial droop  CHEST/LUNG: Clear to auscultation bilaterally with good air entry; No rales, rhonchi, wheezing, or rubs  HEART: Regular rate and rhythm; No murmurs, rubs, or gallops  ABDOMEN: Soft, Nontender, Nondistended, Bowel sounds present, No palpable masses or organomegaly, No bruits  EXTREMITIES:  2+ Peripheral Pulses, No clubbing, cyanosis, or edema, no calf tenderness in either leg    Diagnostic Testin.4   6.09  )-----------( 142      ( 2023 06:00 )             36.5     02-25    136  |  106  |  14  ----------------------------<  86  4.2   |  22  |  0.60    Ca    8.6      2023 06:00  Phos  2.8     02-25  Mg     2.2     02-25    TPro  5.4<L>  /  Alb  2.4<L>  /  TBili  0.7  /  DBili  x   /  AST  34  /  ALT  20  /  AlkPhos  59  02-24           
INTERVAL HPI/OVERNIGHT EVENTS:   Patient seen and examined.  No diarrhea overnight.    REVIEW OF SYSTEMS:  See HPI,  all others negative    PHYSICAL EXAM:  Vital Signs Last 24 Hrs  T(C): 37 (2023 08:26), Max: 38.9 (2023 20:16)  T(F): 98.6 (2023 08:26), Max: 102.1 (2023 20:16)  HR: 93 (:) (80 - 94)  BP: 145/64 (2023 08:26) (109/53 - 145/64)  BP(mean): --  RR: 18 (2023 08:) (15 - 20)  SpO2: 97% (:) (94% - 99%)    Parameters below as of 2023 08:26  Patient On (Oxygen Delivery Method): room air    GENERAL: NAD, well-groomed, well-developed, awake, alert, oriented x to person and place, fluent and coherent speech  EYES: EOMI, conjunctiva and sclera clear  ENMT: No tonsillar erythema, exudates, or enlargement; Moist mucous membranes, No lesions seen on oral mucosa  NECK: Supple, No JVD, No Cervical LAD  NERVOUS SYSTEM:  Good concentration; Moving all 4 extremities against gravity and resistance; No gross sensory deficits, No facial droop  CHEST/LUNG: Clear to auscultation bilaterally with good air entry; No rales, rhonchi, wheezing, or rubs  HEART: Regular rate and rhythm; No murmurs, rubs, or gallops  ABDOMEN: Soft, Nontender, Nondistended, Bowel sounds present, No palpable masses or organomegaly, No bruits  EXTREMITIES:  2+ Peripheral Pulses, No clubbing, cyanosis, or edema, no calf tenderness in either leg    Diagnostic Testin.6   11.36 )-----------( 151      ( 2023 06:30 )             35.1     2023 06:30    137    |  102    |  18     ----------------------------<  90     3.4     |  25     |  0.94     Ca    8.5        2023 06:30  Phos  3.4       2023 06:30  Mg     2.4       2023 06:30    TPro  5.4    /  Alb  2.4    /  TBili  0.7    /  DBili  x      /  AST  34     /  ALT  20     /  AlkPhos  59     2023 06:30    PT/INR - ( 2023 20:45 )   PT: 15.6 sec;   INR: 1.32 ratio         PTT - ( 2023 20:45 )  PTT:26.9 sec  Urinalysis Basic - ( 2023 23:30 )    Color: Yellow / Appearance: Clear / S.010 / pH: x  Gluc: x / Ketone: Negative  / Bili: Negative / Urobili: Negative mg/dL   Blood: x / Protein: 30 mg/dL / Nitrite: Negative   Leuk Esterase: Negative / RBC: 0-2 /HPF / WBC 0-2   Sq Epi: x / Non Sq Epi: Occasional / Bacteria: Occasional           
INTERVAL HPI/OVERNIGHT EVENTS:  No new overnight event.  No N/V/D.  Tolerating diet.    MEDICATIONS  (STANDING):  cholestyramine Powder (Sugar-Free) 4 Gram(s) Oral daily  enoxaparin Injectable 40 milliGRAM(s) SubCutaneous every 24 hours  lactated ringers. 1000 milliLiter(s) (75 mL/Hr) IV Continuous <Continuous>  lactobacillus acidophilus 1 Tablet(s) Oral two times a day with meals  levothyroxine 75 MICROGram(s) Oral daily  saccharomyces boulardii 250 milliGRAM(s) Oral two times a day  sucralfate suspension 1 Gram(s) Oral every 6 hours    MEDICATIONS  (PRN):  acetaminophen     Tablet .. 650 milliGRAM(s) Oral every 6 hours PRN Temp greater or equal to 38C (100.4F), Mild Pain (1 - 3)  melatonin 3 milliGRAM(s) Oral at bedtime PRN Insomnia  ondansetron Injectable 4 milliGRAM(s) IV Push every 8 hours PRN Nausea and/or Vomiting      Allergies    No Known Allergies    Intolerances        Review of Systems:    General:  No wt loss, fevers, chills, night sweats,fatigue,   Eyes:  Good vision, no reported pain  ENT:  No sore throat, pain, runny nose, dysphagia  CV:  No pain, palpitatioins, hypo/hypertension  Resp:  No dyspnea, cough, tachypnea, wheezing  GI:  No pain, No nausea, No vomiting, No diarrhea, No constipatiion, No weight loss, No fever, No pruritis, No rectal bleeding, No tarry stools, No dysphagia,  :  No pain, bleeding, incontinence, nocturia  Muscle:  No pain, weakness  Neuro:  No weakness, tingling, memory problems  Psych:  No fatigue, insomnia, mood problems, depression  Endocrine:  No polyuria, polydypsia, cold/heat intolerance  Heme:  No petechiae, ecchymosis, easy bruisability  Skin:  No rash, tattoos, scars, edema      Vital Signs Last 24 Hrs  T(C): 36.8 (27 Feb 2023 15:01), Max: 36.9 (26 Feb 2023 21:35)  T(F): 98.3 (27 Feb 2023 15:01), Max: 98.4 (26 Feb 2023 21:35)  HR: 60 (27 Feb 2023 15:01) (60 - 83)  BP: 150/68 (27 Feb 2023 15:01) (128/56 - 157/69)  BP(mean): --  RR: 16 (27 Feb 2023 15:01) (16 - 18)  SpO2: 96% (27 Feb 2023 15:01) (95% - 96%)    Parameters below as of 27 Feb 2023 15:01  Patient On (Oxygen Delivery Method): room air        PHYSICAL EXAM:    Constitutional: NAD, well-developed  HEENT: EOMI, throat clear  Neck: No LAD, supple  Respiratory: CTA and P  Cardiovascular: S1 and S2, RRR, no M  Gastrointestinal: BS+, soft, NT/ND, neg HSM,  Extremities: No peripheral edema, neg clubing, cyanosis  Vascular: 2+ peripheral pulses  Neurological: A/O x 3, no focal deficits  Psychiatric: Normal mood, normal affect  Skin: No rashes      LABS:                        11.3   5.64  )-----------( 143      ( 27 Feb 2023 08:06 )             34.2     02-27    137  |  106  |  8   ----------------------------<  85  4.2   |  25  |  0.64    Ca    8.7      27 Feb 2023 08:06  Mg     1.9     02-27            RADIOLOGY & ADDITIONAL TESTS:  
Patient is a 88y old  Female who presents with a chief complaint of weakness/diarrhea (26 Feb 2023 19:00)      INTERVAL HPI/OVERNIGHT EVENTS:overnight events noted    Home Medications:  levothyroxine 75 mcg (0.075 mg) oral tablet: 1 tab(s) orally once a day (24 Feb 2023 01:14)      MEDICATIONS  (STANDING):  cholestyramine Powder (Sugar-Free) 4 Gram(s) Oral daily  enoxaparin Injectable 40 milliGRAM(s) SubCutaneous every 24 hours  lactated ringers. 1000 milliLiter(s) (75 mL/Hr) IV Continuous <Continuous>  lactobacillus acidophilus 1 Tablet(s) Oral two times a day with meals  levothyroxine 75 MICROGram(s) Oral daily  saccharomyces boulardii 250 milliGRAM(s) Oral two times a day  sucralfate suspension 1 Gram(s) Oral every 6 hours    MEDICATIONS  (PRN):  acetaminophen     Tablet .. 650 milliGRAM(s) Oral every 6 hours PRN Temp greater or equal to 38C (100.4F), Mild Pain (1 - 3)  melatonin 3 milliGRAM(s) Oral at bedtime PRN Insomnia  ondansetron Injectable 4 milliGRAM(s) IV Push every 8 hours PRN Nausea and/or Vomiting      Allergies    No Known Allergies    Intolerances        REVIEW OF SYSTEMS:  CONSTITUTIONAL: No fever, weight loss, has fatigue  EYES: No eye pain, visual disturbances, or discharge  ENMT:  No difficulty hearing, tinnitus, vertigo; No sinus or throat pain  NECK: No pain or stiffness  BREASTS: No pain, masses, or nipple discharge  RESPIRATORY: No cough, wheezing, chills or hemoptysis; No shortness of breath  CARDIOVASCULAR: No chest pain, palpitations, dizziness, or leg swelling  GASTROINTESTINAL: No abdominal or epigastric pain. No nausea, vomiting, or hematemesis; No diarrhea or constipation. No melena or hematochezia.  GENITOURINARY: No dysuria, frequency, hematuria, or incontinence  NEUROLOGICAL: No headaches, memory loss, loss of strength, numbness, or tremors    Vital Signs Last 24 Hrs  T(C): 36.6 (27 Feb 2023 05:00), Max: 36.9 (26 Feb 2023 21:35)  T(F): 97.9 (27 Feb 2023 05:00), Max: 98.4 (26 Feb 2023 21:35)  HR: 83 (27 Feb 2023 05:00) (60 - 83)  BP: 128/56 (27 Feb 2023 05:00) (128/56 - 161/67)  BP(mean): --  RR: 18 (27 Feb 2023 05:00) (18 - 19)  SpO2: 95% (27 Feb 2023 05:00) (95% - 96%)    Parameters below as of 27 Feb 2023 05:00  Patient On (Oxygen Delivery Method): room air        PHYSICAL EXAM:  GENERAL: NAD, well-groomed, well-developed  HEAD:  Atraumatic, Normocephalic  EYES: EOMI, PERRLA, conjunctiva and sclera clear  ENMT: Moist mucous membranes,   NECK: Supple, No JVD, Normal thyroid  NERVOUS SYSTEM:  Alert & Oriented X2, non focal  CHEST/LUNG: Clear to percussion bilaterally; No rales, rhonchi, wheezing, or rubs  HEART: Regular rate and rhythm; No murmurs, rubs, or gallops  ABDOMEN: Soft, Nontender, Nondistended; Bowel sounds present  EXTREMITIES:  2+ Peripheral Pulses, No clubbing, cyanosis, or edema  LYMPH: No lymphadenopathy noted  SKIN: No rashes or lesions    LABS:                        11.3   5.64  )-----------( 143      ( 27 Feb 2023 08:06 )             34.2     02-27    137  |  106  |  8   ----------------------------<  85  4.2   |  25  |  0.64    Ca    8.7      27 Feb 2023 08:06  Mg     1.9     02-27          CAPILLARY BLOOD GLUCOSE            Culture - Stool (collected 02-24-23 @ 11:00)  Source: .Stool Feces  Final Report (02-26-23 @ 16:05):    No enteric pathogens isolated.    (Stool culture examined for Salmonella,    Shigella, Campylobacter, Aeromonas, Plesiomonas,    Vibrio, E.coli O157 and Yersinia)    Culture - Urine (collected 02-23-23 @ 23:30)  Source: Clean Catch Clean Catch (Midstream)  Final Report (02-25-23 @ 14:23):    No growth    Culture - Blood (collected 02-23-23 @ 20:46)  Source: .Blood Blood-Peripheral  Preliminary Report (02-25-23 @ 13:01):    No growth to date.    Culture - Blood (collected 02-23-23 @ 20:46)  Source: .Blood Blood-Peripheral  Preliminary Report (02-25-23 @ 13:01):    No growth to date.        I&O's Summary    26 Feb 2023 07:01  -  27 Feb 2023 07:00  --------------------------------------------------------  IN: 1140 mL / OUT: 0 mL / NET: 1140 mL        RADIOLOGY & ADDITIONAL TESTS:    Imaging Personally Reviewed:  [ x] YES  [ ] NO    Consultant(s) Notes Reviewed:  [x ] YES  [ ] NO    Care Discussed with Consultants/Other Providers [x ] YES  [ ] NO
Patient is a 88y old  Female who presents with a chief complaint of weakness/diarrhea (27 Feb 2023 20:27)      INTERVAL HPI/OVERNIGHT EVENTS:overnight events noted    Home Medications:  levothyroxine 75 mcg (0.075 mg) oral tablet: 1 tab(s) orally once a day (24 Feb 2023 01:14)      MEDICATIONS  (STANDING):  cholestyramine Powder (Sugar-Free) 4 Gram(s) Oral daily  enoxaparin Injectable 40 milliGRAM(s) SubCutaneous every 24 hours  lactobacillus acidophilus 1 Tablet(s) Oral two times a day with meals  levothyroxine 75 MICROGram(s) Oral daily  saccharomyces boulardii 250 milliGRAM(s) Oral two times a day  sucralfate suspension 1 Gram(s) Oral every 6 hours    MEDICATIONS  (PRN):  acetaminophen     Tablet .. 650 milliGRAM(s) Oral every 6 hours PRN Temp greater or equal to 38C (100.4F), Mild Pain (1 - 3)  melatonin 3 milliGRAM(s) Oral at bedtime PRN Insomnia  ondansetron Injectable 4 milliGRAM(s) IV Push every 8 hours PRN Nausea and/or Vomiting      Allergies    No Known Allergies    Intolerances        REVIEW OF SYSTEMS:  CONSTITUTIONAL: No fever, weight loss, has fatigue  EYES: No eye pain, visual disturbances, or discharge  ENMT:  No difficulty hearing, tinnitus, vertigo; No sinus or throat pain  NECK: No pain or stiffness  BREASTS: No pain, masses, or nipple discharge  RESPIRATORY: No cough, wheezing, chills or hemoptysis; No shortness of breath  CARDIOVASCULAR: No chest pain, palpitations, dizziness, or leg swelling  GASTROINTESTINAL: No abdominal or epigastric pain. No nausea, vomiting, or hematemesis; No diarrhea or constipation. No melena or hematochezia.  GENITOURINARY: No dysuria, frequency, hematuria, or incontinence  NEUROLOGICAL: No headaches, memory loss, loss of strength, numbness, or tremors  SKIN: No itching, burning, rashes, or lesions   LYMPH NODES: No enlarged glands  ENDOCRINE: No heat or cold intolerance; No hair loss  MUSCULOSKELETAL: No joint pain or swelling; No muscle, back, or extremity pain  PSYCHIATRIC: No depression, anxiety, mood swings, or difficulty sleeping  HEME/LYMPH: No easy bruising, or bleeding gums  ALLERGY AND IMMUNOLOGIC: No hives or eczema    Vital Signs Last 24 Hrs  T(C): 36.3 (27 Feb 2023 21:00), Max: 36.8 (27 Feb 2023 15:01)  T(F): 97.3 (27 Feb 2023 21:00), Max: 98.3 (27 Feb 2023 15:01)  HR: 61 (27 Feb 2023 21:00) (60 - 61)  BP: 170/89 (27 Feb 2023 22:06) (150/68 - 174/73)  BP(mean): --  RR: 16 (27 Feb 2023 22:06) (15 - 16)  SpO2: 98% (27 Feb 2023 22:06) (96% - 98%)    Parameters below as of 27 Feb 2023 22:06  Patient On (Oxygen Delivery Method): room air        PHYSICAL EXAM:  GENERAL: NAD, well-groomed, well-developed  HEAD:  Atraumatic, Normocephalic  EYES: EOMI, PERRLA, conjunctiva and sclera clear  ENMT: Moist mucous membranes,   NECK: Supple, No JVD, Normal thyroid  NERVOUS SYSTEM:  Alert & Oriented X3, non focal  CHEST/LUNG: Clear to percussion bilaterally; No rales, rhonchi, wheezing, or rubs  HEART: Regular rate and rhythm; No murmurs, rubs, or gallops  ABDOMEN: Soft, Nontender, Nondistended; Bowel sounds present  EXTREMITIES:  2+ Peripheral Pulses, No clubbing, cyanosis, or edema      LABS:                        11.8   6.20  )-----------( 153      ( 28 Feb 2023 08:13 )             35.1     02-28    138  |  105  |  9   ----------------------------<  88  4.3   |  26  |  0.63    Ca    8.9      28 Feb 2023 08:13  Mg     1.9     02-27    TPro  5.6<L>  /  Alb  2.6<L>  /  TBili  0.5  /  DBili  x   /  AST  49<H>  /  ALT  45  /  AlkPhos  64  02-28        CAPILLARY BLOOD GLUCOSE            Culture - Stool (collected 02-24-23 @ 11:00)  Source: .Stool Feces  Final Report (02-26-23 @ 16:05):    No enteric pathogens isolated.    (Stool culture examined for Salmonella,    Shigella, Campylobacter, Aeromonas, Plesiomonas,    Vibrio, E.coli O157 and Yersinia)    Culture - Urine (collected 02-23-23 @ 23:30)  Source: Clean Catch Clean Catch (Midstream)  Final Report (02-25-23 @ 14:23):    No growth    Culture - Blood (collected 02-23-23 @ 20:46)  Source: .Blood Blood-Peripheral  Preliminary Report (02-25-23 @ 13:01):    No growth to date.    Culture - Blood (collected 02-23-23 @ 20:46)  Source: .Blood Blood-Peripheral  Preliminary Report (02-25-23 @ 13:01):    No growth to date.        I&O's Summary      RADIOLOGY & ADDITIONAL TESTS:    Imaging Personally Reviewed:  [ x] YES  [ ] NO    Consultant(s) Notes Reviewed:  [x YES  [ ] NO    Care Discussed with Consultants/Other Providers [x ] YES  [ ] NO

## 2023-03-01 NOTE — PROGRESS NOTE ADULT - PROVIDER SPECIALTY LIST ADULT
Gastroenterology
Hospitalist
Hospitalist
Infectious Disease
Internal Medicine
Gastroenterology
Hospitalist
Infectious Disease
Infectious Disease
Internal Medicine
Internal Medicine
Gastroenterology
Infectious Disease
Infectious Disease

## 2023-03-01 NOTE — CAREGIVER ENGAGEMENT NOTE - CAREGIVER EDUCATION NOTES - FREE TEXT
Per discussion w/ inpatient interdisciplinary treatment team this AM, patient remains medically cleared for transition to next level of care today, 03/01/2023.  spoke w/ Cara in the admissions dept for patient's preferred sub-acute rehab facility, [facility] [place], who confirmed that patient has been accepted w/ a start of care date/time for today, 03/01/2023, @ 13:30. Afterward,  met w/ patient @ bedside on unit [facility] who reported to be understanding of and agreeable to the above discharge plan. Next,  spoke w/ patient's daughter, Mrs. Jess Sen, @ [phone] who also confirmed to be understanding of and agreeable to the above discharge plan.  then requested ambulette for discharge transport via sending electronic referral to [facility] -- trip assigned to transport provider [email or web address] @ [phone], and daughter agreeable to call in credit card payment for such.    Attending MD Darnell & unit RN Wander both aware. Packet of clinical information left in RN station on unit to accompany patient to receiving facility.  to remain available for any continued needs.

## 2023-03-01 NOTE — CAREGIVER ENGAGEMENT NOTE - CAREGIVER EDUCATION - TYPES DISCUSSED
After-care skilled tasks/Discharge plan/DME/Insurance benefits/Post-acute care agency contact/Post-discharge escalation process/Transportation coordination/Transportation letter provided
Discharge plan/DME

## 2023-03-01 NOTE — CAREGIVER ENGAGEMENT NOTE - CAREGIVER NAME
Jess Sen
Patient; patient's daughter, Mrs. Jess Sen, @ [phone]
Patient's daughter, Mrs. Jess Sen, @ [phone]
Patient; patient's daughter, Mrs. Jess Sen, @ [phone]

## 2023-03-21 ENCOUNTER — OFFICE (OUTPATIENT)
Dept: URBAN - METROPOLITAN AREA CLINIC 94 | Facility: CLINIC | Age: 88
Setting detail: OPHTHALMOLOGY
End: 2023-03-21
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
## 2023-03-21 PROCEDURE — NO SHOW FE NO SHOW FEE: Performed by: OPHTHALMOLOGY

## 2023-03-29 ENCOUNTER — OFFICE (OUTPATIENT)
Dept: URBAN - METROPOLITAN AREA CLINIC 94 | Facility: CLINIC | Age: 88
Setting detail: OPHTHALMOLOGY
End: 2023-03-29
Payer: MEDICARE

## 2023-03-29 DIAGNOSIS — H34.8110: ICD-10-CM

## 2023-03-29 DIAGNOSIS — H34.8130: ICD-10-CM

## 2023-03-29 DIAGNOSIS — H43.12: ICD-10-CM

## 2023-03-29 PROCEDURE — 92134 CPTRZ OPH DX IMG PST SGM RTA: CPT | Performed by: OPHTHALMOLOGY

## 2023-03-29 PROCEDURE — 67028 INJECTION EYE DRUG: CPT | Performed by: OPHTHALMOLOGY

## 2023-03-29 ASSESSMENT — TONOMETRY
OS_IOP_MMHG: 16
OD_IOP_MMHG: 15

## 2023-03-29 ASSESSMENT — CONFRONTATIONAL VISUAL FIELD TEST (CVF)
OS_FINDINGS: FULL
OD_FINDINGS: FULL

## 2023-03-29 ASSESSMENT — VISUAL ACUITY
OS_BCVA: 20/50+1
OD_BCVA: HM

## 2023-05-23 ENCOUNTER — OFFICE (OUTPATIENT)
Dept: URBAN - METROPOLITAN AREA CLINIC 94 | Facility: CLINIC | Age: 88
Setting detail: OPHTHALMOLOGY
End: 2023-05-23
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
## 2023-05-23 PROCEDURE — NO SHOW FE NO SHOW FEE: Performed by: OPHTHALMOLOGY

## 2023-06-13 ENCOUNTER — ASC (OUTPATIENT)
Dept: URBAN - METROPOLITAN AREA SURGERY 8 | Facility: SURGERY | Age: 88
Setting detail: OPHTHALMOLOGY
End: 2023-06-13
Payer: MEDICARE

## 2023-06-13 DIAGNOSIS — H34.8110: ICD-10-CM

## 2023-06-13 PROCEDURE — 67210 TREATMENT OF RETINAL LESION: CPT | Performed by: OPHTHALMOLOGY

## 2023-06-13 ASSESSMENT — VISUAL ACUITY
OD_BCVA: FC@1FT
OS_BCVA: 20/50

## 2023-06-13 ASSESSMENT — CONFRONTATIONAL VISUAL FIELD TEST (CVF)
OS_FINDINGS: FULL
OD_FINDINGS: FULL

## 2023-06-13 ASSESSMENT — TONOMETRY: OS_IOP_MMHG: 13

## 2023-07-11 ENCOUNTER — OFFICE (OUTPATIENT)
Dept: URBAN - METROPOLITAN AREA CLINIC 94 | Facility: CLINIC | Age: 88
Setting detail: OPHTHALMOLOGY
End: 2023-07-11
Payer: MEDICARE

## 2023-07-11 DIAGNOSIS — H34.8130: ICD-10-CM

## 2023-07-11 DIAGNOSIS — H34.8110: ICD-10-CM

## 2023-07-11 PROCEDURE — 67028 INJECTION EYE DRUG: CPT | Performed by: OPHTHALMOLOGY

## 2023-07-11 PROCEDURE — 92134 CPTRZ OPH DX IMG PST SGM RTA: CPT | Performed by: OPHTHALMOLOGY

## 2023-07-11 PROCEDURE — 99024 POSTOP FOLLOW-UP VISIT: CPT | Performed by: OPHTHALMOLOGY

## 2023-07-11 ASSESSMENT — VISUAL ACUITY
OS_BCVA: 20/50-1
OD_BCVA: FC@1FT

## 2023-07-11 ASSESSMENT — TONOMETRY: OD_IOP_MMHG: 16

## 2023-07-11 ASSESSMENT — CONFRONTATIONAL VISUAL FIELD TEST (CVF)
OS_FINDINGS: FULL
OD_FINDINGS: FULL

## 2023-08-29 ENCOUNTER — OFFICE (OUTPATIENT)
Dept: URBAN - METROPOLITAN AREA CLINIC 94 | Facility: CLINIC | Age: 88
Setting detail: OPHTHALMOLOGY
End: 2023-08-29
Payer: MEDICARE

## 2023-08-29 DIAGNOSIS — H43.12: ICD-10-CM

## 2023-08-29 DIAGNOSIS — H34.8130: ICD-10-CM

## 2023-08-29 DIAGNOSIS — H34.8110: ICD-10-CM

## 2023-08-29 PROCEDURE — 67028 INJECTION EYE DRUG: CPT | Performed by: OPHTHALMOLOGY

## 2023-08-29 PROCEDURE — 92134 CPTRZ OPH DX IMG PST SGM RTA: CPT | Performed by: OPHTHALMOLOGY

## 2023-08-29 ASSESSMENT — VISUAL ACUITY
OD_BCVA: FC@1FT
OS_BCVA: 20/60

## 2023-08-29 ASSESSMENT — TONOMETRY
OS_IOP_MMHG: 19
OD_IOP_MMHG: 19

## 2023-08-29 ASSESSMENT — CONFRONTATIONAL VISUAL FIELD TEST (CVF)
OD_FINDINGS: FULL
OS_FINDINGS: FULL

## 2023-09-05 ENCOUNTER — OFFICE (OUTPATIENT)
Dept: URBAN - METROPOLITAN AREA CLINIC 94 | Facility: CLINIC | Age: 88
Setting detail: OPHTHALMOLOGY
End: 2023-09-05
Payer: MEDICARE

## 2023-09-05 DIAGNOSIS — H44.19: ICD-10-CM

## 2023-09-05 PROCEDURE — 67028 INJECTION EYE DRUG: CPT | Performed by: OPHTHALMOLOGY

## 2023-09-05 ASSESSMENT — CONFRONTATIONAL VISUAL FIELD TEST (CVF)
OS_FINDINGS: FULL
OD_FINDINGS: FULL

## 2023-09-05 ASSESSMENT — VISUAL ACUITY
OS_BCVA: 20/HM
OD_BCVA: 20/300

## 2023-09-06 ENCOUNTER — OFFICE (OUTPATIENT)
Dept: URBAN - METROPOLITAN AREA CLINIC 94 | Facility: CLINIC | Age: 88
Setting detail: OPHTHALMOLOGY
End: 2023-09-06
Payer: MEDICARE

## 2023-09-06 DIAGNOSIS — H44.19: ICD-10-CM

## 2023-09-06 PROCEDURE — 99024 POSTOP FOLLOW-UP VISIT: CPT | Performed by: OPHTHALMOLOGY

## 2023-09-06 PROCEDURE — 67028 INJECTION EYE DRUG: CPT | Performed by: OPHTHALMOLOGY

## 2023-09-06 PROCEDURE — 92134 CPTRZ OPH DX IMG PST SGM RTA: CPT | Performed by: OPHTHALMOLOGY

## 2023-09-06 ASSESSMENT — TONOMETRY
OS_IOP_MMHG: 15
OD_IOP_MMHG: 18

## 2023-09-06 ASSESSMENT — VISUAL ACUITY
OS_BCVA: 20/HM
OD_BCVA: 20/300

## 2023-09-07 ENCOUNTER — OFFICE (OUTPATIENT)
Dept: URBAN - METROPOLITAN AREA CLINIC 94 | Facility: CLINIC | Age: 88
Setting detail: OPHTHALMOLOGY
End: 2023-09-07
Payer: MEDICARE

## 2023-09-07 DIAGNOSIS — H44.19: ICD-10-CM

## 2023-09-07 PROCEDURE — 92285 EXTERNAL OCULAR PHOTOGRAPHY: CPT | Performed by: SPECIALIST

## 2023-09-07 PROCEDURE — 99024 POSTOP FOLLOW-UP VISIT: CPT | Performed by: SPECIALIST

## 2023-09-07 ASSESSMENT — TONOMETRY
OD_IOP_MMHG: 13
OS_IOP_MMHG: 14

## 2023-09-07 ASSESSMENT — CONFRONTATIONAL VISUAL FIELD TEST (CVF): OS_FINDINGS: FULL

## 2023-09-07 ASSESSMENT — VISUAL ACUITY
OD_BCVA: 20/200
OS_BCVA: 20/HM

## 2023-09-12 ENCOUNTER — OFFICE (OUTPATIENT)
Dept: URBAN - METROPOLITAN AREA CLINIC 94 | Facility: CLINIC | Age: 88
Setting detail: OPHTHALMOLOGY
End: 2023-09-12
Payer: MEDICARE

## 2023-09-12 DIAGNOSIS — H44.19: ICD-10-CM

## 2023-09-12 DIAGNOSIS — H34.8130: ICD-10-CM

## 2023-09-12 PROCEDURE — 99213 OFFICE O/P EST LOW 20 MIN: CPT | Performed by: OPHTHALMOLOGY

## 2023-09-12 PROCEDURE — 92134 CPTRZ OPH DX IMG PST SGM RTA: CPT | Performed by: OPHTHALMOLOGY

## 2023-09-12 ASSESSMENT — VISUAL ACUITY
OD_BCVA: 20/300
OS_BCVA: 20/HM

## 2023-09-12 ASSESSMENT — TONOMETRY
OS_IOP_MMHG: 20
OD_IOP_MMHG: 19

## 2023-09-12 ASSESSMENT — CONFRONTATIONAL VISUAL FIELD TEST (CVF)
OD_FINDINGS: FULL
OS_FINDINGS: FULL

## 2023-09-19 ENCOUNTER — OFFICE (OUTPATIENT)
Dept: URBAN - METROPOLITAN AREA CLINIC 94 | Facility: CLINIC | Age: 88
Setting detail: OPHTHALMOLOGY
End: 2023-09-19
Payer: MEDICARE

## 2023-09-19 DIAGNOSIS — H34.8110: ICD-10-CM

## 2023-09-19 DIAGNOSIS — H34.8130: ICD-10-CM

## 2023-09-19 PROBLEM — H44.19 ENDOPHTHALMITIS, OTHER ; RIGHT EYE: Status: ACTIVE | Noted: 2023-09-05

## 2023-09-19 PROCEDURE — 92134 CPTRZ OPH DX IMG PST SGM RTA: CPT | Performed by: OPHTHALMOLOGY

## 2023-09-19 PROCEDURE — 67210 TREATMENT OF RETINAL LESION: CPT | Performed by: OPHTHALMOLOGY

## 2023-09-19 ASSESSMENT — TONOMETRY
OD_IOP_MMHG: 17
OS_IOP_MMHG: 15

## 2023-09-19 ASSESSMENT — VISUAL ACUITY
OS_BCVA: 20/FC@3FT
OD_BCVA: 20/250

## 2023-09-19 ASSESSMENT — CONFRONTATIONAL VISUAL FIELD TEST (CVF)
OS_FINDINGS: FULL
OD_FINDINGS: FULL

## 2023-10-17 ENCOUNTER — ASC (OUTPATIENT)
Dept: URBAN - METROPOLITAN AREA SURGERY 8 | Facility: SURGERY | Age: 88
Setting detail: OPHTHALMOLOGY
End: 2023-10-17
Payer: MEDICARE

## 2023-10-17 DIAGNOSIS — H34.8120: ICD-10-CM

## 2023-10-17 PROCEDURE — 67210 TREATMENT OF RETINAL LESION: CPT | Mod: 79,LT | Performed by: OPHTHALMOLOGY

## 2023-10-17 ASSESSMENT — VISUAL ACUITY
OS_BCVA: 20/100-1
OD_BCVA: 20/250

## 2023-10-17 ASSESSMENT — CONFRONTATIONAL VISUAL FIELD TEST (CVF)
OD_FINDINGS: FULL
OS_FINDINGS: FULL

## 2023-10-17 ASSESSMENT — TONOMETRY
OS_IOP_MMHG: 21
OD_IOP_MMHG: 18

## 2023-11-29 ENCOUNTER — OFFICE (OUTPATIENT)
Dept: URBAN - METROPOLITAN AREA CLINIC 94 | Facility: CLINIC | Age: 88
Setting detail: OPHTHALMOLOGY
End: 2023-11-29
Payer: MEDICARE

## 2023-11-29 DIAGNOSIS — H34.8130: ICD-10-CM

## 2023-11-29 PROCEDURE — 92134 CPTRZ OPH DX IMG PST SGM RTA: CPT | Performed by: OPHTHALMOLOGY

## 2023-11-29 PROCEDURE — 67028 INJECTION EYE DRUG: CPT | Mod: 58,50 | Performed by: OPHTHALMOLOGY

## 2023-11-29 ASSESSMENT — CONFRONTATIONAL VISUAL FIELD TEST (CVF)
OS_FINDINGS: FULL
OD_FINDINGS: FULL

## 2024-01-16 ENCOUNTER — OFFICE (OUTPATIENT)
Dept: URBAN - METROPOLITAN AREA CLINIC 94 | Facility: CLINIC | Age: 89
Setting detail: OPHTHALMOLOGY
End: 2024-01-16
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
## 2024-01-16 PROCEDURE — NO SHOW FE NO SHOW FEE: Performed by: OPHTHALMOLOGY

## 2024-01-31 ENCOUNTER — ASC (OUTPATIENT)
Dept: URBAN - METROPOLITAN AREA SURGERY 8 | Facility: SURGERY | Age: 89
Setting detail: OPHTHALMOLOGY
End: 2024-01-31
Payer: MEDICARE

## 2024-01-31 ENCOUNTER — OFFICE (OUTPATIENT)
Dept: URBAN - METROPOLITAN AREA CLINIC 94 | Facility: CLINIC | Age: 89
Setting detail: OPHTHALMOLOGY
End: 2024-01-31
Payer: MEDICARE

## 2024-01-31 DIAGNOSIS — H34.8130: ICD-10-CM

## 2024-01-31 DIAGNOSIS — H34.8110: ICD-10-CM

## 2024-01-31 DIAGNOSIS — H44.19: ICD-10-CM

## 2024-01-31 PROCEDURE — 67210 TREATMENT OF RETINAL LESION: CPT | Mod: RT | Performed by: OPHTHALMOLOGY

## 2024-01-31 PROCEDURE — 92134 CPTRZ OPH DX IMG PST SGM RTA: CPT | Performed by: OPHTHALMOLOGY

## 2024-01-31 ASSESSMENT — CONFRONTATIONAL VISUAL FIELD TEST (CVF)
OS_FINDINGS: FULL
OD_FINDINGS: FULL

## 2024-02-07 ENCOUNTER — ASC (OUTPATIENT)
Dept: URBAN - METROPOLITAN AREA SURGERY 8 | Facility: SURGERY | Age: 89
Setting detail: OPHTHALMOLOGY
End: 2024-02-07
Payer: MEDICARE

## 2024-02-07 ENCOUNTER — OFFICE (OUTPATIENT)
Dept: URBAN - METROPOLITAN AREA CLINIC 94 | Facility: CLINIC | Age: 89
Setting detail: OPHTHALMOLOGY
End: 2024-02-07
Payer: MEDICARE

## 2024-02-07 DIAGNOSIS — H34.8130: ICD-10-CM

## 2024-02-07 DIAGNOSIS — H34.8120: ICD-10-CM

## 2024-02-07 PROCEDURE — 67210 TREATMENT OF RETINAL LESION: CPT | Mod: 79,LT | Performed by: OPHTHALMOLOGY

## 2024-02-07 PROCEDURE — 92134 CPTRZ OPH DX IMG PST SGM RTA: CPT | Performed by: OPHTHALMOLOGY

## 2024-02-07 ASSESSMENT — CONFRONTATIONAL VISUAL FIELD TEST (CVF)
OS_FINDINGS: FULL
OD_FINDINGS: FULL

## 2024-03-12 ENCOUNTER — OFFICE (OUTPATIENT)
Dept: URBAN - METROPOLITAN AREA CLINIC 94 | Facility: CLINIC | Age: 89
Setting detail: OPHTHALMOLOGY
End: 2024-03-12
Payer: MEDICARE

## 2024-03-12 DIAGNOSIS — H34.8130: ICD-10-CM

## 2024-03-12 DIAGNOSIS — H34.8110: ICD-10-CM

## 2024-03-12 PROCEDURE — 67028 INJECTION EYE DRUG: CPT | Mod: 58,RT | Performed by: OPHTHALMOLOGY

## 2024-03-12 PROCEDURE — 92134 CPTRZ OPH DX IMG PST SGM RTA: CPT | Performed by: OPHTHALMOLOGY

## 2024-03-25 ENCOUNTER — OFFICE (OUTPATIENT)
Dept: URBAN - METROPOLITAN AREA CLINIC 12 | Facility: CLINIC | Age: 89
Setting detail: OPHTHALMOLOGY
End: 2024-03-25

## 2024-03-25 DIAGNOSIS — H90.3: ICD-10-CM

## 2024-03-25 PROCEDURE — NO SHOW FE NO SHOW FEE

## 2024-05-29 ENCOUNTER — OFFICE (OUTPATIENT)
Dept: URBAN - METROPOLITAN AREA CLINIC 94 | Facility: CLINIC | Age: 89
Setting detail: OPHTHALMOLOGY
End: 2024-05-29
Payer: MEDICARE

## 2024-05-29 DIAGNOSIS — H34.8130: ICD-10-CM

## 2024-05-29 DIAGNOSIS — H34.8120: ICD-10-CM

## 2024-05-29 DIAGNOSIS — H34.8110: ICD-10-CM

## 2024-05-29 PROCEDURE — 67028 INJECTION EYE DRUG: CPT | Mod: RT | Performed by: OPHTHALMOLOGY

## 2024-05-29 PROCEDURE — 92134 CPTRZ OPH DX IMG PST SGM RTA: CPT | Performed by: OPHTHALMOLOGY

## 2024-05-29 ASSESSMENT — CONFRONTATIONAL VISUAL FIELD TEST (CVF)
OD_FINDINGS: FULL
OS_FINDINGS: FULL

## 2024-07-09 ENCOUNTER — OFFICE (OUTPATIENT)
Dept: URBAN - METROPOLITAN AREA CLINIC 94 | Facility: CLINIC | Age: 89
Setting detail: OPHTHALMOLOGY
End: 2024-07-09
Payer: MEDICARE

## 2024-07-09 DIAGNOSIS — H34.8130: ICD-10-CM

## 2024-07-09 PROCEDURE — 67028 INJECTION EYE DRUG: CPT | Mod: 50 | Performed by: OPHTHALMOLOGY

## 2024-07-09 PROCEDURE — 92134 CPTRZ OPH DX IMG PST SGM RTA: CPT | Performed by: OPHTHALMOLOGY

## 2024-07-09 ASSESSMENT — CONFRONTATIONAL VISUAL FIELD TEST (CVF)
OD_FINDINGS: FULL
OS_FINDINGS: FULL

## 2024-08-07 NOTE — DISCHARGE NOTE PROVIDER - NSDCHOSPICE_GEN_A_CORE
Called the pt and the appt has been made with Dr. Nogueira in Sep/2024. She verbalized understood.    No

## 2024-08-12 ENCOUNTER — APPOINTMENT (OUTPATIENT)
Dept: OBGYN | Facility: CLINIC | Age: 89
End: 2024-08-12
Payer: MEDICARE

## 2024-08-12 VITALS
SYSTOLIC BLOOD PRESSURE: 148 MMHG | BODY MASS INDEX: 26.46 KG/M2 | WEIGHT: 155 LBS | DIASTOLIC BLOOD PRESSURE: 86 MMHG | HEIGHT: 64 IN

## 2024-08-12 DIAGNOSIS — N88.2 STRICTURE AND STENOSIS OF CERVIX UTERI: ICD-10-CM

## 2024-08-12 DIAGNOSIS — Z01.419 ENCOUNTER FOR GYNECOLOGICAL EXAMINATION (GENERAL) (ROUTINE) W/OUT ABNORMAL FINDINGS: ICD-10-CM

## 2024-08-12 PROCEDURE — 99203 OFFICE O/P NEW LOW 30 MIN: CPT | Mod: 25

## 2024-08-12 PROCEDURE — G0101: CPT

## 2024-08-12 RX ORDER — MISOPROSTOL 200 UG/1
200 TABLET ORAL
Qty: 2 | Refills: 0 | Status: ACTIVE | COMMUNITY
Start: 2024-08-12 | End: 1900-01-01

## 2024-08-12 RX ORDER — LEVOTHYROXINE SODIUM 0.03 MG/1
25 TABLET ORAL
Refills: 0 | Status: ACTIVE | COMMUNITY

## 2024-08-13 ENCOUNTER — ASC (OUTPATIENT)
Dept: URBAN - METROPOLITAN AREA SURGERY 8 | Facility: SURGERY | Age: 89
Setting detail: OPHTHALMOLOGY
End: 2024-08-13
Payer: MEDICARE

## 2024-08-13 DIAGNOSIS — H34.8110: ICD-10-CM

## 2024-08-13 LAB — HPV HIGH+LOW RISK DNA PNL CVX: NOT DETECTED

## 2024-08-13 PROCEDURE — 67210 TREATMENT OF RETINAL LESION: CPT | Mod: RT | Performed by: OPHTHALMOLOGY

## 2024-08-13 ASSESSMENT — CONFRONTATIONAL VISUAL FIELD TEST (CVF)
OS_FINDINGS: FULL
OD_FINDINGS: FULL

## 2024-08-14 LAB — CYTOLOGY CVX/VAG DOC THIN PREP: ABNORMAL

## 2024-08-22 ENCOUNTER — LABORATORY RESULT (OUTPATIENT)
Age: 89
End: 2024-08-22

## 2024-08-22 ENCOUNTER — APPOINTMENT (OUTPATIENT)
Dept: OBGYN | Facility: CLINIC | Age: 89
End: 2024-08-22
Payer: MEDICARE

## 2024-08-22 VITALS
WEIGHT: 155 LBS | DIASTOLIC BLOOD PRESSURE: 84 MMHG | SYSTOLIC BLOOD PRESSURE: 138 MMHG | HEIGHT: 64 IN | BODY MASS INDEX: 26.46 KG/M2

## 2024-08-22 DIAGNOSIS — R93.89 ABNORMAL FINDINGS ON DIAGNOSTIC IMAGING OF OTHER SPECIFIED BODY STRUCTURES: ICD-10-CM

## 2024-08-22 PROCEDURE — 58100 BIOPSY OF UTERUS LINING: CPT

## 2024-08-22 NOTE — PROCEDURE
[Endometrial Biopsy] : Endometrial biopsy [Time out performed] : Pre-procedure time out performed.  Patient's name, date of birth and procedure confirmed. [Consent Obtained] : Consent obtained [Thickened Endometrium] : thickened endometrium [Risks] : risks [Benefits] : benefits [Alternatives] : alternatives [Patient] : patient [Infection] : infection [Bleeding] : bleeding [Allergic Reaction] : allergic reaction [Uterine Perforation] : uterine perforation [Pain] : pain [Negative] : negative pregnancy test [Cytotec] : Cytotec [Betadine] : Betadine [None] : none [Tenaculum] : Tenaculum [Easy Passage] : Easy passage [Anteverted] : anteverted [Scant] : scant [Specimen Collected] : collected [Sent to Pathology] : placed in buffered formalin and sent for pathology [Tolerated Well] : Patient tolerated the procedure well [No Complications] : No complications [de-identified] : EMB

## 2024-09-03 ENCOUNTER — ASC (OUTPATIENT)
Dept: URBAN - METROPOLITAN AREA SURGERY 8 | Facility: SURGERY | Age: 89
Setting detail: OPHTHALMOLOGY
End: 2024-09-03
Payer: MEDICARE

## 2024-09-03 DIAGNOSIS — H34.8120: ICD-10-CM

## 2024-09-03 PROCEDURE — 67210 TREATMENT OF RETINAL LESION: CPT | Mod: 79,LT | Performed by: OPHTHALMOLOGY

## 2024-09-03 ASSESSMENT — CONFRONTATIONAL VISUAL FIELD TEST (CVF)
OS_FINDINGS: FULL
OD_FINDINGS: FULL

## 2024-09-10 ENCOUNTER — OUTPATIENT (OUTPATIENT)
Dept: OUTPATIENT SERVICES | Facility: HOSPITAL | Age: 89
LOS: 1 days | End: 2024-09-10
Payer: MEDICARE

## 2024-09-10 VITALS
RESPIRATION RATE: 16 BRPM | SYSTOLIC BLOOD PRESSURE: 141 MMHG | HEIGHT: 63 IN | OXYGEN SATURATION: 99 % | WEIGHT: 152.56 LBS | DIASTOLIC BLOOD PRESSURE: 65 MMHG | TEMPERATURE: 98 F | HEART RATE: 63 BPM

## 2024-09-10 DIAGNOSIS — E03.9 HYPOTHYROIDISM, UNSPECIFIED: ICD-10-CM

## 2024-09-10 DIAGNOSIS — Z85.3 PERSONAL HISTORY OF MALIGNANT NEOPLASM OF BREAST: ICD-10-CM

## 2024-09-10 DIAGNOSIS — N65.0 DEFORMITY OF RECONSTRUCTED BREAST: ICD-10-CM

## 2024-09-10 DIAGNOSIS — Z01.818 ENCOUNTER FOR OTHER PREPROCEDURAL EXAMINATION: ICD-10-CM

## 2024-09-10 DIAGNOSIS — Z98.49 CATARACT EXTRACTION STATUS, UNSPECIFIED EYE: Chronic | ICD-10-CM

## 2024-09-10 DIAGNOSIS — Z98.891 HISTORY OF UTERINE SCAR FROM PREVIOUS SURGERY: Chronic | ICD-10-CM

## 2024-09-10 PROCEDURE — 88321 CONSLTJ&REPRT SLD PREP ELSWR: CPT

## 2024-09-10 PROCEDURE — 93010 ELECTROCARDIOGRAM REPORT: CPT | Mod: NC

## 2024-09-10 RX ORDER — LEVOTHYROXINE SODIUM 100 MCG
1 TABLET ORAL
Refills: 0 | DISCHARGE

## 2024-09-10 NOTE — H&P PST ADULT - NSANTHOSAYNRD_GEN_A_CORE
No. DIOR screening performed.  STOP BANG Legend: 0-2 = LOW Risk; 3-4 = INTERMEDIATE Risk; 5-8 = HIGH Risk

## 2024-09-10 NOTE — H&P PST ADULT - COMMENTS
Denies h/o or family h/o DVT, PE  Denies bleeding or clotting disorders  top full dentures, bottom partial

## 2024-09-10 NOTE — H&P PST ADULT - NSANTHTOTALSCORECAL_ENT_A_CORE
[Overweight - ( BMI 25.1 - 29.9 )] : overweight -  ( BMI 25.1 - 29.9 ) [Continue diet as tolerated] : continue diet as tolerated based on goals of care [Non - Smoker] : non-smoker [Use assistive device to avoid falls] : use assistive device to avoid falls [] : foot exam [Not Recommended] : Aspirin use not recommended due to overall prognosis [Improve exercise tolerance] : improve exercise tolerance [Improve mobility] : improve mobility [Improve weight] : improve weight [Decrease stress] : decrease stress [Decrease hospital use] : decrease hospital use [Minimize unnecessary interventions] : minimize unnecessary interventions [Comfort Care] : comfort care 1 [Maintain functional ability] : maintain functional ability [Discussed disease trajectory with patient/caregiver] : discussed disease trajectory with patient/caregiver [Likely to achieve goals/desired outcomes] : likely to achieve goals/desired outcomes [Patient/Caregiver has ___ understanding of disease process] : patient/caregiver has [unfilled] understanding of disease process [Advanced Directives discussed: ____] : Advanced directives discussed: [unfilled] [DNR] : Code Status: DNR [Limited] : Treatment Guidelines: Limited [DNI] : Intubation: DNI [[facility]: _____] [facility]/last verification date: [unfilled]

## 2024-09-10 NOTE — H&P PST ADULT - HISTORY OF PRESENT ILLNESS
This is a 89 y/o female with PMHX of hypothyroidism, who presents to PST with pre-operative diagnosis of malignant neoplasm of breast. Pt felt lump in left breast.  Abnormal findings in left breast on imaging. Biopsy revealed DCIS.  Denies any breast pain, palpable mass in right breast or nipple discharge b/l.  Otherwise pt feels well today and denies any acute symptoms.

## 2024-09-17 PROCEDURE — 36415 COLL VENOUS BLD VENIPUNCTURE: CPT

## 2024-09-17 PROCEDURE — 93005 ELECTROCARDIOGRAM TRACING: CPT

## 2024-09-17 PROCEDURE — G0463: CPT

## 2024-09-17 PROCEDURE — 88321 CONSLTJ&REPRT SLD PREP ELSWR: CPT

## 2024-09-19 ENCOUNTER — OUTPATIENT (OUTPATIENT)
Dept: OUTPATIENT SERVICES | Facility: HOSPITAL | Age: 89
LOS: 1 days | End: 2024-09-19
Payer: COMMERCIAL

## 2024-09-19 DIAGNOSIS — Z98.891 HISTORY OF UTERINE SCAR FROM PREVIOUS SURGERY: Chronic | ICD-10-CM

## 2024-09-19 DIAGNOSIS — Z98.49 CATARACT EXTRACTION STATUS, UNSPECIFIED EYE: Chronic | ICD-10-CM

## 2024-09-19 PROCEDURE — A9541: CPT

## 2024-09-19 PROCEDURE — 38792 RA TRACER ID OF SENTINL NODE: CPT

## 2024-09-24 ENCOUNTER — TRANSCRIPTION ENCOUNTER (OUTPATIENT)
Age: 89
End: 2024-09-24

## 2024-09-24 ENCOUNTER — INPATIENT (INPATIENT)
Facility: HOSPITAL | Age: 89
LOS: 2 days | Discharge: NURSING, REHAB OR LONG-TERM CARE | End: 2024-09-27
Attending: SURGERY | Admitting: SURGERY
Payer: MEDICARE

## 2024-09-24 ENCOUNTER — APPOINTMENT (OUTPATIENT)
Dept: NUCLEAR MEDICINE | Facility: HOSPITAL | Age: 89
End: 2024-09-24

## 2024-09-24 ENCOUNTER — APPOINTMENT (OUTPATIENT)
Dept: CARDIOLOGY | Facility: CLINIC | Age: 89
End: 2024-09-24

## 2024-09-24 VITALS
SYSTOLIC BLOOD PRESSURE: 161 MMHG | DIASTOLIC BLOOD PRESSURE: 75 MMHG | WEIGHT: 152.56 LBS | HEIGHT: 63 IN | OXYGEN SATURATION: 100 % | TEMPERATURE: 98 F | HEART RATE: 63 BPM | RESPIRATION RATE: 18 BRPM

## 2024-09-24 DIAGNOSIS — Z85.3 PERSONAL HISTORY OF MALIGNANT NEOPLASM OF BREAST: ICD-10-CM

## 2024-09-24 DIAGNOSIS — Z98.49 CATARACT EXTRACTION STATUS, UNSPECIFIED EYE: Chronic | ICD-10-CM

## 2024-09-24 DIAGNOSIS — Z98.891 HISTORY OF UTERINE SCAR FROM PREVIOUS SURGERY: Chronic | ICD-10-CM

## 2024-09-24 PROCEDURE — 88309 TISSUE EXAM BY PATHOLOGIST: CPT | Mod: 26

## 2024-09-24 PROCEDURE — 88331 PATH CONSLTJ SURG 1 BLK 1SPC: CPT | Mod: 26

## 2024-09-24 PROCEDURE — 88307 TISSUE EXAM BY PATHOLOGIST: CPT | Mod: 26

## 2024-09-24 PROCEDURE — 88305 TISSUE EXAM BY PATHOLOGIST: CPT | Mod: 26

## 2024-09-24 RX ORDER — FENTANYL CITRATE-0.9 % NACL/PF 300MCG/30
25 PATIENT CONTROLLED ANALGESIA VIAL INJECTION
Refills: 0 | Status: DISCONTINUED | OUTPATIENT
Start: 2024-09-24 | End: 2024-09-24

## 2024-09-24 RX ORDER — ACETAMINOPHEN 325 MG
975 TABLET ORAL EVERY 6 HOURS
Refills: 0 | Status: DISCONTINUED | OUTPATIENT
Start: 2024-09-24 | End: 2024-09-27

## 2024-09-24 RX ORDER — ONDANSETRON HCL/PF 4 MG/2 ML
4 VIAL (ML) INJECTION ONCE
Refills: 0 | Status: DISCONTINUED | OUTPATIENT
Start: 2024-09-24 | End: 2024-09-24

## 2024-09-24 RX ORDER — LEVOTHYROXINE SODIUM 100 MCG
1 TABLET ORAL
Refills: 0 | DISCHARGE

## 2024-09-24 RX ORDER — SODIUM CHLORIDE IRRIG SOLUTION 0.9 %
1000 SOLUTION, IRRIGATION IRRIGATION
Refills: 0 | Status: DISCONTINUED | OUTPATIENT
Start: 2024-09-24 | End: 2024-09-24

## 2024-09-24 RX ORDER — ONDANSETRON HCL/PF 4 MG/2 ML
4 VIAL (ML) INJECTION EVERY 6 HOURS
Refills: 0 | Status: DISCONTINUED | OUTPATIENT
Start: 2024-09-24 | End: 2024-09-27

## 2024-09-24 RX ORDER — OXYCODONE HYDROCHLORIDE 30 MG/1
5 TABLET, FILM COATED, EXTENDED RELEASE ORAL EVERY 6 HOURS
Refills: 0 | Status: DISCONTINUED | OUTPATIENT
Start: 2024-09-24 | End: 2024-09-27

## 2024-09-24 RX ORDER — FENTANYL CITRATE-0.9 % NACL/PF 300MCG/30
50 PATIENT CONTROLLED ANALGESIA VIAL INJECTION
Refills: 0 | Status: DISCONTINUED | OUTPATIENT
Start: 2024-09-24 | End: 2024-09-24

## 2024-09-24 RX ORDER — OXYCODONE HYDROCHLORIDE 30 MG/1
2.5 TABLET, FILM COATED, EXTENDED RELEASE ORAL EVERY 6 HOURS
Refills: 0 | Status: DISCONTINUED | OUTPATIENT
Start: 2024-09-24 | End: 2024-09-27

## 2024-09-24 RX ADMIN — Medication 25 MICROGRAM(S): at 17:59

## 2024-09-24 RX ADMIN — OXYCODONE HYDROCHLORIDE 5 MILLIGRAM(S): 30 TABLET, FILM COATED, EXTENDED RELEASE ORAL at 23:31

## 2024-09-24 RX ADMIN — Medication 25 MICROGRAM(S): at 17:44

## 2024-09-24 RX ADMIN — Medication 25 MICROGRAM(S): at 16:00

## 2024-09-24 RX ADMIN — Medication 975 MILLIGRAM(S): at 23:31

## 2024-09-24 RX ADMIN — Medication 25 MICROGRAM(S): at 15:50

## 2024-09-24 NOTE — ASU DISCHARGE PLAN (ADULT/PEDIATRIC) - ASU DC SPECIAL INSTRUCTIONSFT
WOUND CARE: Please keep your surgical bra on and dry for 48 hours. You have a clear glue on your left armpit incision which will roll up and fall off on it's own. You have an antibiotic gel on your other incision under your breast. You may take Tylenol every 6 hours for pain. Do NOT exceed more than the daily maximum dose of 4,000mg a day.    BATHING: You may shower after 48 hours. Please do not submerge wound underwater. You may shower and/or sponge bathe.   ACTIVITY: No heavy lifting or straining. Otherwise, you may return to your usual level of physical activity. If you are taking narcotic pain medication (such as Percocet), do NOT drive a car, operate machinery or make important decisions.  DIET: Return to your usual diet.  NOTIFY YOUR SURGEON IF: You have any bleeding that does not stop, any pus draining from your wound, any fever (over 100.4 F) or chills, persistent nausea/vomiting, persistent diarrhea, or if your pain is not controlled on your discharge pain medications.  FOLLOW-UP:  1. Follow-up with Dr. Carolina Thompson within 1-2 weeks of discharge. Please call office for appointment  2. Please follow up with your primary care physician in one week regarding your hospitalization.

## 2024-09-24 NOTE — BRIEF OPERATIVE NOTE - SPECIMENS
Left Breast Carcinoma - Fresh  Left breast sentinel lymph node Left Breast Carcinoma x1  L Breast Inferior, L Breast Superior, L Breast Medial, L Breast Lateral = 4 -> True Margins marked with stitch   Left breast sentinel lymph node 1, Left breast sentinel lymph node 2

## 2024-09-24 NOTE — BRIEF OPERATIVE NOTE - NSICDXBRIEFPREOP_GEN_ALL_CORE_FT
PRE-OP DIAGNOSIS:  Malignant neoplasm of upper-outer quadrant of left breast 24-Sep-2024 15:30:40  Archana Ryan  
PRE-OP DIAGNOSIS:  Malignant neoplasm of upper-outer quadrant of left breast 24-Sep-2024 15:30:40  Archana Ryan

## 2024-09-24 NOTE — ASU DISCHARGE PLAN (ADULT/PEDIATRIC) - PROCEDURE
Left Breast Lumpectomy, L [place] Lymph Node Biopsy, L Axillary Lymph Node Dissection with Pectoralis Major repair and closure. Left Breast Lumpectomy, L [place] Lymph Node Biopsy, L Axillary Lymph Node Dissection with Pectoralis Major repair and closure.

## 2024-09-24 NOTE — BRIEF OPERATIVE NOTE - OPERATION/FINDINGS
LEFT BREAST ONCOPLASTIC RECONSTRUCTION, REPAIR OF PECTORALIS MAJOR MUSCLE, CLOSURE OF LEFT AXILLA  
Left Breast Mass, Left breast sentinel lymph node

## 2024-09-24 NOTE — BRIEF OPERATIVE NOTE - COMMENTS
L axillary dressed with dermabond. L inferior breast site dressed with Bacitracin. Abd pads placed and surgical bra placed. L axillary dressed with dermabond. L inferior breast site dressed with Bacitracin. Abd pads placed on wound with surgical bra placed.

## 2024-09-24 NOTE — PATIENT PROFILE ADULT - FALL HARM RISK - FACTORS NURSING JUDGEMENT
----- Message from Sheron Walsh sent at 8/17/2018 11:14 AM CDT -----  Contact: self  Patient need to speak with nurse regarding information on when will his spinal tap be scheduled    Please call to advice [phone] (home)       Patient states he is in pain   
Spoke with patient and stated we have not received the cardiology clearance for procedure. Left a message with Dr. Herbert's nurse to check the status.  
Yes

## 2024-09-24 NOTE — BRIEF OPERATIVE NOTE - NSICDXBRIEFPROCEDURE_GEN_ALL_CORE_FT
PROCEDURES:  Repair of pectoralis major 24-Sep-2024 17:54:17  Archana Ryan  Revision, reconstruction, breast 24-Sep-2024 17:56:18  Archana Ryan  Complex closure of wound of left breast 24-Sep-2024 17:56:53  Archana Ryan  
PROCEDURES:  Lumpectomy, breast, left 24-Sep-2024 15:28:50  Archana Ryan  Biopsy of sentinel lymph node of left axilla 24-Sep-2024 15:29:29  Archana Ryan  Left axillary lymph node dissection 24-Sep-2024 15:28:29  Archana Ryan

## 2024-09-24 NOTE — PHYSICAL THERAPY INITIAL EVALUATION ADULT - BED MOBILITY TRAINING, PT EVAL
Problem: Alteration in Thoughts and Perception  Goal: Verbalize thoughts and feelings  Description: Interventions:  - Promote a nonjudgmental and trusting relationship with the patient through active listening and therapeutic communication  - Assess patient's level of functioning, behavior and potential for risk  - Engage patient in 1 on 1 interactions  - Encourage patient to express fears, feelings, frustrations, and discuss symptoms    - [place] patient to reality, help patient recognize reality-based thinking   - Administer medications as ordered and assess for potential side effects  - Provide the patient education related to the signs and symptoms of the illness and desired effects of prescribed medications  Outcome: Progressing  Goal: Agree to be compliant with medication regime, as prescribed and report medication side effects  Description: Interventions:  - Offer appropriate PRN medication and supervise ingestion; conduct AIMS, as needed   Outcome: Progressing  Goal: Attend and participate in unit activities, including therapeutic, recreational, and educational groups  Description: Interventions:  -Encourage Visitation and family involvement in care  Outcome: Progressing  Goal: Complete daily ADLs, including personal hygiene independently, as able  Description: Interventions:  - Observe, teach, and assist patient with ADLS  - Monitor and promote a balance of rest/activity, with adequate nutrition and elimination   Outcome: Progressing     Problem: Ineffective Coping  Goal: Identifies ineffective coping skills  Outcome: Progressing  Goal: Identifies healthy coping skills  Outcome: Progressing  Goal: Demonstrates healthy coping skills  Outcome: Progressing     Problem: Risk for Self Injury/Neglect  Goal: Verbalize thoughts and feelings  Description: Interventions:  - Assess and re-assess patient's lethality and potential for self-injury  - Engage patient in 1:1 interactions, daily, for a minimum of 15  minutes  - Encourage patient to express feelings, fears, frustrations, hopes  - Establish rapport/trust with patient   Outcome: Progressing  Goal: Refrain from harming self  Description: Interventions:  - Monitor patient closely, per order  - Develop a trusting relationship  - Supervise medication ingestion, monitor effects and side effects   Outcome: Progressing  Goal: Attend and participate in unit activities, including therapeutic, recreational, and educational groups  Description: Interventions:  - Provide therapeutic and educational activities daily, encourage attendance and participation, and document same in the medical record  - Obtain collateral information, encourage visitation and family involvement in care   Outcome: Progressing     Problem: Depression  Goal: Refrain from isolation  Description: Interventions:  - Develop a trusting relationship   - Encourage socialization   Outcome: Progressing  Goal: Refrain from self-neglect  Outcome: Progressing     Problem: Anxiety  Goal: Anxiety is at manageable level  Description: Interventions:  - Assess and monitor patient's anxiety level.   - Monitor for signs and symptoms (heart palpitations, chest pain, shortness of breath, headaches, nausea, feeling jumpy, restlessness, irritable, apprehensive).   - Collaborate with interdisciplinary team and initiate plan and interventions as ordered.  - [place] patient to unit/surroundings  - Explain treatment plan  - Encourage participation in care  - Encourage verbalization of concerns/fears  - Identify coping mechanisms  - Assist in developing anxiety-reducing skills  - Administer/offer alternative therapies  - Limit or eliminate stimulants  Outcome: Progressing     Problem: Ineffective Coping  Goal: Participates in unit activities  Description: Interventions:  - Provide therapeutic environment   - Provide required programming   - Redirect inappropriate behaviors   Outcome: Progressing     Problem: SELF  HARM/SUICIDALITY  Goal: Will have no self-injury during hospital stay  Description: INTERVENTIONS:  - Q 15 MINUTES: Routine safety checks  - Q WAKING SHIFT & PRN: Assess risk to determine if routine checks are adequate to maintain patient safety  - Encourage patient to participate actively in care by formulating a plan to combat response to suicidal ideation, identify supports and resources  Outcome: Progressing     Problem: PAIN - ADULT  Goal: Verbalizes/displays adequate comfort level or baseline comfort level  Description: Interventions:  - Encourage patient to monitor pain and request assistance  - Assess pain using appropriate pain scale  - Administer analgesics based on type and severity of pain and evaluate response  - Implement non-pharmacological measures as appropriate and evaluate response  - Consider cultural and social influences on pain and pain management  - Notify physician/advanced practitioner if interventions unsuccessful or patient reports new pain  Outcome: Completed      Goals (3-5 sessions): Sup<->sit independent

## 2024-09-24 NOTE — ASU DISCHARGE PLAN (ADULT/PEDIATRIC) - FOLLOW UP APPOINTMENTS
may also call Recovery Room (PACU) 24/7 @ [phone]/[facility]

## 2024-09-24 NOTE — BRIEF OPERATIVE NOTE - NSICDXBRIEFPOSTOP_GEN_ALL_CORE_FT
POST-OP DIAGNOSIS:  Malignant neoplasm of upper-outer quadrant of left breast 24-Sep-2024 15:30:57  Archana Ryan  
POST-OP DIAGNOSIS:  Malignant neoplasm of upper-outer quadrant of left breast 24-Sep-2024 15:30:57  Archana Ryan

## 2024-09-24 NOTE — ASU DISCHARGE PLAN (ADULT/PEDIATRIC) - CARE PROVIDER_API CALL
Carolina Thompson-Cherelle  Surgery  [address], [address]  [place] [address]  Phone: [phone]  Fax: [phone]  Established Patient  Follow Up Time: 2 weeks

## 2024-09-24 NOTE — ASU DISCHARGE PLAN (ADULT/PEDIATRIC) - NURSING INSTRUCTIONS
DO NOT take any Tylenol (Acetaminophen) or narcotics containing Tylenol until after  _8pm__ . You received Tylenol during your operation and it can cause damage to your liver if too much is taken within a 24 hour time period.

## 2024-09-24 NOTE — CHART NOTE - NSCHARTNOTEFT_GEN_A_CORE
SURGERY POST OP CHECK    STATUS POST PROCEDURE: Left breast lumpectomy, sentinel lymph node biopsy    SUBJECTIVE: Pt seen and examined at bedside. Patient reports appropriate surgical incisional pain; pain is controlled, however report sorness of the breast. Denies fevers/chills, chest pain, dyspnea, nausea, vomiting   Pt has not passed gas or had bowel movement yet. Pt is voiding well. Pt is tolerating diet. Pt is ambulating well    --------------------------------------------------------------------------------------------------------------------------------------------------------------------------------------------------------------  OBJECTIVE:  Vital Signs Last 24 Hrs  T(C): 36.5 (24 Sep 2024 17:30), Max: 36.6 (24 Sep 2024 10:47)  T(F): 97.7 (24 Sep 2024 17:30), Max: 97.9 (24 Sep 2024 10:47)  HR: 90 (24 Sep 2024 20:06) (50 - 90)  BP: 142/59 (24 Sep 2024 20:06) (117/55 - 161/75)  BP(mean): 85 (24 Sep 2024 17:30) (69 - 85)  RR: 16 (24 Sep 2024 20:06) (11 - 20)  SpO2: 98% (24 Sep 2024 20:06) (92% - 100%)    Parameters below as of 24 Sep 2024 20:06  Patient On (Oxygen Delivery Method): room air      I&O's Summary    24 Sep 2024 07:01  -  24 Sep 2024 20:49  --------------------------------------------------------  IN: 390 mL / OUT: 0 mL / NET: 390 mL        PHYSICAL EXAM:  Constitutional: Well developed, well nourished, NAD  Chest: Symmetric chest rise bilaterally, no increased WOB  Abdomen: Soft, nontender, nondistended.  Breast: Surgical bra with the underlying dressing c/d/i.   ----------------------------------------------------------------------------------------------------------------------------------------------------------------------------------------------------------------  Pt is s/p L breast lumpectomy, sentinal lymph node biopsy, pec major closure and reconstruction; POD #0    PLAN:  - Diet: Regular  - Analgesia and antiemetics as needed  - Incentive spirometry  - OOB as tolerated  - DVT prophylaxis: SCD  - Monitor overnight  - Dispo: Discharge home in AM    E team  [id number]

## 2024-09-24 NOTE — PATIENT PROFILE ADULT - IS THERE A SUSPICION OF ABUSE/NEGLIGENCE?
3/16/2022 2031  Last data filed at 3/16/Ana Luisa Doss [facility],3/16/2022,8:31 PM no

## 2024-09-25 ENCOUNTER — TRANSCRIPTION ENCOUNTER (OUTPATIENT)
Age: 89
End: 2024-09-25

## 2024-09-25 RX ORDER — ACETAMINOPHEN 325 MG
3 TABLET ORAL
Qty: 0 | Refills: 0 | DISCHARGE
Start: 2024-09-25

## 2024-09-25 RX ADMIN — Medication 975 MILLIGRAM(S): at 00:24

## 2024-09-25 RX ADMIN — Medication 975 MILLIGRAM(S): at 18:12

## 2024-09-25 RX ADMIN — Medication 975 MILLIGRAM(S): at 18:49

## 2024-09-25 RX ADMIN — OXYCODONE HYDROCHLORIDE 5 MILLIGRAM(S): 30 TABLET, FILM COATED, EXTENDED RELEASE ORAL at 05:33

## 2024-09-25 RX ADMIN — Medication 975 MILLIGRAM(S): at 06:20

## 2024-09-25 RX ADMIN — Medication 975 MILLIGRAM(S): at 12:30

## 2024-09-25 RX ADMIN — Medication 975 MILLIGRAM(S): at 11:33

## 2024-09-25 RX ADMIN — OXYCODONE HYDROCHLORIDE 5 MILLIGRAM(S): 30 TABLET, FILM COATED, EXTENDED RELEASE ORAL at 06:33

## 2024-09-25 RX ADMIN — Medication 975 MILLIGRAM(S): at 05:20

## 2024-09-25 RX ADMIN — OXYCODONE HYDROCHLORIDE 5 MILLIGRAM(S): 30 TABLET, FILM COATED, EXTENDED RELEASE ORAL at 00:24

## 2024-09-25 NOTE — DISCHARGE NOTE PROVIDER - HOSPITAL COURSE
This patient is a 89F with malignant neoplasm of upper-outer quadrant of left breast who presented to [facility] on 9/24/24 for scheduled surgery. Patient taken to the OR by Dr. Thompson and underwent L breast lumpectomy, sentinal lymph node biopsy, pec major closure and reconstruction. Patient tolerated operation well and there were no post-operative complications identified. Patient remained hemodynamically stable in the PACU and transferred to the surgical floor. Diet was restarted and advanced as tolerated. At this time, patient is currently ambulating, voiding, tolerating a regular diet. Pain well controlled on PO pain meds. Patient has been deemed stable for discharge home with follow up as an outpatient. This patient is a 89F with malignant neoplasm of upper-outer quadrant of left breast who presented to [facility] on 9/24/24 for scheduled surgery. Patient taken to the OR by Dr. Thompson and underwent L breast lumpectomy, sentinal lymph node biopsy, pec major closure and reconstruction. Patient tolerated operation well and there were no post-operative complications identified. Patient remained hemodynamically stable in the PACU and transferred to the surgical floor. Diet was restarted and advanced as tolerated. At this time, patient is currently ambulating, voiding, tolerating a regular diet. Pain well controlled on PO pain meds. Physical therapy evaluated patient on 9/26 and recommend restorative rehab. Patient has been deemed stable for discharge to rehab with follow up as an outpatient.

## 2024-09-25 NOTE — DISCHARGE NOTE PROVIDER - CARE PROVIDER_API CALL
Carolina Thompson-Cherelle  Surgery  [address], [address]  [place] [address]  Phone: [phone]  Fax: [phone]  Follow Up Time: 1 week    Sanjeev Ponce  Plastic Surgery  [address], [address]  [place] [address]  Phone: [phone]  Fax: [phone]  Follow Up Time: 1 week

## 2024-09-25 NOTE — PROGRESS NOTE ADULT - SUBJECTIVE AND OBJECTIVE BOX
SURGERY DAILY PROGRESS NOTE    Overnight Events: Patient was admitted for post-operative monitoring due to elevated blood pressures to the 200s while in the OR. Post operatively blood pressures have been within normal limits.     SUBJECTIVE: Patient seen and evaluated on AM rounds. Pt is resting comfortably in bed and states that her pain is improving.   -----------------------------------------------------------------------------------------------------------------------------------------------------------------------------------------------------------  OBJECTIVE:  Vital Signs Last 24 Hrs  T(C): 36.6 (25 Sep 2024 05:32), Max: 36.7 (24 Sep 2024 22:52)  T(F): 97.8 (25 Sep 2024 05:32), Max: 98 (24 Sep 2024 22:52)  HR: 81 (25 Sep 2024 05:32) (50 - 98)  BP: 149/53 (25 Sep 2024 05:32) (117/55 - 161/75)  BP(mean): 78 (24 Sep 2024 20:47) (69 - 85)  RR: 19 (25 Sep 2024 05:32) (11 - 20)  SpO2: 99% (25 Sep 2024 05:32) (92% - 100%)    Parameters below as of 25 Sep 2024 05:32  Patient On (Oxygen Delivery Method): room air      I&O's Detail    24 Sep 2024 07:01  -  25 Sep 2024 07:00  --------------------------------------------------------  IN:    Lactated Ringers: 150 mL    Oral Fluid: 240 mL  Total IN: 390 mL    OUT:    Voided (mL): 350 mL  Total OUT: 350 mL    Total NET: 40 mL        Daily Height in cm: 160.02 (24 Sep 2024 10:47)    Daily   MEDICATIONS  (STANDING):  acetaminophen     Tablet .. 975 milliGRAM(s) Oral every 6 hours  levothyroxine 75 MICROGram(s) Oral daily    MEDICATIONS  (PRN):  ondansetron Injectable 4 milliGRAM(s) IV Push every 6 hours PRN Nausea and/or Vomiting  oxyCODONE    IR 2.5 milliGRAM(s) Oral every 6 hours PRN Moderate Pain (4 - 6)  oxyCODONE    IR 5 milliGRAM(s) Oral every 6 hours PRN Severe Pain (7 - 10)      PHYSICAL EXAM:  General: NAD, resting comfortably in bed  HEENT: Normocephalic atraumatic  Respiratory: Nonlabored respirations  Breast: incision under left breast is c/d/i, axillary incision is c/d/i and nontender   Neuro: awake and alert answering questions appropriately

## 2024-09-25 NOTE — DISCHARGE NOTE NURSING/CASE MANAGEMENT/SOCIAL WORK - NSDCCRNAME_GEN_ALL_CORE_FT
Abdirashid [facility] / [address] [place] [address]

## 2024-09-25 NOTE — DISCHARGE NOTE PROVIDER - NSDCCPCAREPLAN_GEN_ALL_CORE_FT
PRINCIPAL DISCHARGE DIAGNOSIS  Diagnosis: Malignant neoplasm of upper-outer quadrant of left breast  Assessment and Plan of Treatment: WOUND CARE:  Keep surgical bra on as much as tolerated. Please keep incisions clean and dry. Please do not Scrub or rub incisions. Do not use lotion or powder on incisions.   BATHING: You may shower and/or sponge bathe. You may use warm soapy water in the shower and rinse, pat dry.  ACTIVITY: No heavy lifting or straining. Otherwise, you may return to your usual level of physical activity.   PAIN: You may take over-the-counter medications for pain. You make take Tylenol orally every 6 hours as needed. Do not exceed 4,000mg a day. You may take ibuprofen every 6 hours. You may alternate between the two for better pain control (every 3 hours).   DIET: Return to your usual diet.  NOTIFY YOUR SURGEON IF YOU HAVE: any bleeding that does not stop, any pus draining from your wound(s), any fever (over 100.4 F) persistent nausea/vomiting, or if your pain is not controlled on over the counter pain medication.  FOLLOW UP:  1. Please follow up with your primary care physician in one week regarding your hospitalization, bring copies of your discharge paperwork.  2. Please follow up with your surgeon, Dr. Thompson. Call [phone] to make an appointment.  3. Please follow up with your plastic surgeon, Dr. Ponce. Call to make an appointment.

## 2024-09-25 NOTE — DISCHARGE NOTE PROVIDER - NSDCMRMEDTOKEN_GEN_ALL_CORE_FT
acetaminophen 325 mg oral tablet: 3 tab(s) orally every 6 hours  levothyroxine 75 mcg (0.075 mg) oral tablet: 1 tab(s) orally once a day

## 2024-09-25 NOTE — DISCHARGE NOTE PROVIDER - PROVIDER TOKENS
PROVIDER:[TOKEN:[[id number]],FOLLOWUP:[1 week]],PROVIDER:[TOKEN:[[id number]],FOLLOWUP:[1 week]]

## 2024-09-25 NOTE — DISCHARGE NOTE PROVIDER - NSDCCPTREATMENT_GEN_ALL_CORE_FT
PRINCIPAL PROCEDURE  Procedure: Lumpectomy, breast, left  Findings and Treatment:       SECONDARY PROCEDURE  Procedure: Biopsy of sentinel lymph node of left axilla  Findings and Treatment:     Procedure: Complex closure of wound of left breast  Findings and Treatment:     Procedure: Repair of pectoralis major  Findings and Treatment:     Procedure: Left axillary lymph node dissection  Findings and Treatment:

## 2024-09-25 NOTE — DISCHARGE NOTE NURSING/CASE MANAGEMENT/SOCIAL WORK - NSDCPEFALRISK_GEN_ALL_CORE
For information on Fall & Injury Prevention, visit: [email or web address] OR  [email or web address] OR  https://www.cdc.gov/steadi/patient.html

## 2024-09-25 NOTE — DISCHARGE NOTE NURSING/CASE MANAGEMENT/SOCIAL WORK - PATIENT PORTAL LINK FT
You can access the FollowMyHealth Patient Portal offered by [facility] by registering at the following website: [email or web address]. By joining [employer]’s FollowMyHealth portal, you will also be able to view your health information using other applications (apps) compatible with our system.

## 2024-09-25 NOTE — DISCHARGE NOTE NURSING/CASE MANAGEMENT/SOCIAL WORK - NSDCPNINST_GEN_ALL_CORE
Notify MD of temp 101, chills, increased pain not relieved with pain medication, or increased drainage at incision site.  Make follow up appointment with Dr. Thompson.  Follow up with PCP.

## 2024-09-26 RX ORDER — MAGNESIUM HYDROXIDE 400 MG/5ML
10 SUSPENSION, ORAL (FINAL DOSE FORM) ORAL ONCE
Refills: 0 | Status: COMPLETED | OUTPATIENT
Start: 2024-09-26 | End: 2024-09-26

## 2024-09-26 RX ADMIN — Medication 975 MILLIGRAM(S): at 13:12

## 2024-09-26 RX ADMIN — Medication 975 MILLIGRAM(S): at 01:08

## 2024-09-26 RX ADMIN — Medication 975 MILLIGRAM(S): at 00:08

## 2024-09-26 RX ADMIN — Medication 975 MILLIGRAM(S): at 17:24

## 2024-09-26 RX ADMIN — Medication 975 MILLIGRAM(S): at 11:32

## 2024-09-26 RX ADMIN — Medication 975 MILLIGRAM(S): at 05:36

## 2024-09-26 RX ADMIN — Medication 975 MILLIGRAM(S): at 06:36

## 2024-09-26 RX ADMIN — Medication 10 MILLILITER(S): at 14:08

## 2024-09-26 NOTE — PHYSICAL THERAPY INITIAL EVALUATION ADULT - GAIT DEVIATIONS NOTED, PT EVAL
multiple bouts of lateral unsteadiness requiring therapist assistance/decreased abbey/decreased velocity of limb motion/decreased step length/decreased stride length/decreased weight-shifting ability

## 2024-09-26 NOTE — PHYSICAL THERAPY INITIAL EVALUATION ADULT - GENERAL OBSERVATIONS, REHAB EVAL
Upon entry, pt seated in bedside chair in NAD. HR 72 bpm. Pt left semi-supine in bed with all tubes/lines intact, bed alarm on, call bell in reach and in NAD.

## 2024-09-26 NOTE — CHART NOTE - NSCHARTNOTEFT_GEN_A_CORE
I spoke with the patients daughter Archana [phone] to tell her that the patient had been cleared for discharge. Her daughter told me that she felt her mother's gait was unsteady and that she would like her to be evaluated by physical therapy prior to leaving the hospital. A PT consult was ordered and will be followed up to inform further discharge planning.

## 2024-09-26 NOTE — PHYSICAL THERAPY INITIAL EVALUATION ADULT - DID THE PATIENT HAVE SURGERY?
Lumpectomy, breast, left; Biopsy of sentinel lymph node of left axilla; Left axillary lymph node dissection; Repair of pectoralis Revision, reconstruction, breast; Complex closure of wound of left breast POD#2/yes

## 2024-09-26 NOTE — PROGRESS NOTE ADULT - SUBJECTIVE AND OBJECTIVE BOX
SURGERY DAILY PROGRESS NOTE      SUBJECTIVE: Patient seen and evaluated on AM rounds. Pt is resting comfortably in bed endorses a little pain but states that it has improved.    -----------------------------------------------------------------------------------------------------------------------------------------------------------------------------------------------------------  OBJECTIVE:  Vital Signs Last 24 Hrs  T(C): 36.7 (26 Sep 2024 09:04), Max: 37.4 (25 Sep 2024 22:16)  T(F): 98.1 (26 Sep 2024 09:04), Max: 99.4 (25 Sep 2024 22:16)  HR: 70 (26 Sep 2024 09:04) (65 - 80)  BP: 127/65 (26 Sep 2024 09:04) (119/69 - 138/59)  BP(mean): --  RR: 17 (26 Sep 2024 09:04) (16 - 18)  SpO2: 96% (26 Sep 2024 09:04) (95% - 100%)    Parameters below as of 26 Sep 2024 09:04  Patient On (Oxygen Delivery Method): room air      I&O's Detail    25 Sep 2024 07:01  -  26 Sep 2024 07:00  --------------------------------------------------------  IN:  Total IN: 0 mL    OUT:    Voided (mL): 700 mL  Total OUT: 700 mL    Total NET: -700 mL      26 Sep 2024 07:01  -  26 Sep 2024 09:13  --------------------------------------------------------  IN:  Total IN: 0 mL    OUT:    Voided (mL): 300 mL  Total OUT: 300 mL    Total NET: -300 mL        Daily     Daily   MEDICATIONS  (STANDING):  acetaminophen     Tablet .. 975 milliGRAM(s) Oral every 6 hours  levothyroxine 75 MICROGram(s) Oral daily    MEDICATIONS  (PRN):  ondansetron Injectable 4 milliGRAM(s) IV Push every 6 hours PRN Nausea and/or Vomiting  oxyCODONE    IR 2.5 milliGRAM(s) Oral every 6 hours PRN Moderate Pain (4 - 6)  oxyCODONE    IR 5 milliGRAM(s) Oral every 6 hours PRN Severe Pain (7 - 10)      PHYSICAL EXAM:  General: NAD, resting comfortably in bed  HEENT: Normocephalic atraumatic  Respiratory: Nonlabored respirations on RA  Breast: incision under left breast is c/d/i, axillary incision is c/d/i and nontender   Neuro: awake and alert answering questions appropriately

## 2024-09-27 VITALS
TEMPERATURE: 98 F | HEART RATE: 67 BPM | SYSTOLIC BLOOD PRESSURE: 130 MMHG | OXYGEN SATURATION: 95 % | RESPIRATION RATE: 17 BRPM | DIASTOLIC BLOOD PRESSURE: 51 MMHG

## 2024-09-27 RX ORDER — BISACODYL 5 MG/1
10 TABLET, COATED ORAL ONCE
Refills: 0 | Status: COMPLETED | OUTPATIENT
Start: 2024-09-27 | End: 2024-09-27

## 2024-09-27 RX ADMIN — Medication 975 MILLIGRAM(S): at 06:55

## 2024-09-27 RX ADMIN — Medication 975 MILLIGRAM(S): at 11:44

## 2024-09-27 RX ADMIN — Medication 975 MILLIGRAM(S): at 06:00

## 2024-09-27 RX ADMIN — BISACODYL 10 MILLIGRAM(S): 5 TABLET, COATED ORAL at 09:56

## 2024-09-27 RX ADMIN — Medication 975 MILLIGRAM(S): at 12:13

## 2024-09-27 NOTE — PROGRESS NOTE ADULT - ASSESSMENT
89 F PMH hypothyroidism POD #2 s/p L breast lumpectomy, sentinal lymph node biopsy, pec major closure and reconstruction (9/25). Was admitted for post-operative monitoring due to elevated blood pressures to the 200s while in the OR. Post operatively blood pressures have been within normal limits. Patient recovering appropriately on the floor and ready for discharge.       PLAN:  - Diet: Regular  - Analgesia and antiemetics as needed  - Incentive spirometry  - OOB as tolerated  - discharge today     E Team  [id number]
89 F PMH hypothyroidism POD #1 s/p L breast lumpectomy, sentinal lymph node biopsy, pec major closure and reconstruction (9/25). Was admitted for post-operative monitoring due to elevated blood pressures to the 200s while in the OR. Post operatively blood pressures have been within normal limits.       PLAN:  - Diet: Regular  - Analgesia and antiemetics as needed  - Incentive spirometry  - OOB as tolerated    E Team  [id number]
89 F PMH hypothyroidism POD #2 s/p L breast lumpectomy, sentinal lymph node biopsy, pec major closure and reconstruction (9/25). Was admitted for post-operative monitoring due to elevated blood pressures to the 200s while in the OR. Post operatively blood pressures have been within normal limits. Patient recovering appropriately on the floor and ready for discharge to rehab.      PLAN:  - Diet: Regular  - Analgesia and antiemetics as needed  - Incentive spirometry  - OOB as tolerated  - dulcolax suppository for constipation  - PT eval rec rehab  - dispo planning to rehab    E Team  [id number]

## 2024-09-27 NOTE — PROGRESS NOTE ADULT - SUBJECTIVE AND OBJECTIVE BOX
Surgical Oncology E Team Daily Progress Note    SUBJECTIVE:    Vital Signs Last 24 Hrs  T(C): 36.8 (26 Sep 2024 21:32), Max: 37.1 (26 Sep 2024 13:30)  T(F): 98.2 (26 Sep 2024 21:32), Max: 98.8 (26 Sep 2024 13:30)  HR: 73 (26 Sep 2024 21:32) (69 - 73)  BP: 142/52 (26 Sep 2024 21:32) (119/62 - 149/61)  RR: 18 (26 Sep 2024 21:32) (17 - 18)  SpO2: 97% (26 Sep 2024 21:32) (96% - 97%)  Parameters below as of 26 Sep 2024 21:32  Patient On (Oxygen Delivery Method): room air        General Appearance: Appears well, NAD  Neck: Supple  Chest: Equal expansion bilaterally  Breast: incision under left breast is c/d/i, axillary incision is c/d/i and nontender   CV: Pulse regular presently  Abdomen: Soft, nontender, nondistended  Extremities: Grossly symmetric, SCD's in place     I&O's Summary    25 Sep 2024 07:01  -  26 Sep 2024 07:00  --------------------------------------------------------  IN: 0 mL / OUT: 700 mL / NET: -700 mL    26 Sep 2024 07:01  -  27 Sep 2024 06:38  --------------------------------------------------------  IN: 0 mL / OUT: 850 mL / NET: -850 mL      I&O's Detail    25 Sep 2024 07:01  -  26 Sep 2024 07:00  --------------------------------------------------------  IN:  Total IN: 0 mL    OUT:    Voided (mL): 700 mL  Total OUT: 700 mL    Total NET: -700 mL      26 Sep 2024 07:01  -  27 Sep 2024 06:38  --------------------------------------------------------  IN:  Total IN: 0 mL    OUT:    Voided (mL): 850 mL  Total OUT: 850 mL    Total NET: -850 mL          MEDICATIONS  (STANDING):  acetaminophen     Tablet .. 975 milliGRAM(s) Oral every 6 hours  bisacodyl Suppository 10 milliGRAM(s) Rectal once  heparin   Injectable 5000 Unit(s) SubCutaneous every 8 hours  levothyroxine 75 MICROGram(s) Oral daily    MEDICATIONS  (PRN):  ondansetron Injectable 4 milliGRAM(s) IV Push every 6 hours PRN Nausea and/or Vomiting  oxyCODONE    IR 2.5 milliGRAM(s) Oral every 6 hours PRN Moderate Pain (4 - 6)  oxyCODONE    IR 5 milliGRAM(s) Oral every 6 hours PRN Severe Pain (7 - 10)      LABS:                RADIOLOGY & ADDITIONAL STUDIES: Surgical Oncology E Team Daily Progress Note    SUBJECTIVE: Patient seen and examined by team on morning rounds. No acute events overnight, resting comfortably in bed. Denies chest pain, palpitations, SOB, dizziness, fever, chills, N/V/D. Patient passing flatus, no BM complaining of constipation this morning.    Vital Signs Last 24 Hrs  T(C): 36.8 (26 Sep 2024 21:32), Max: 37.1 (26 Sep 2024 13:30)  T(F): 98.2 (26 Sep 2024 21:32), Max: 98.8 (26 Sep 2024 13:30)  HR: 73 (26 Sep 2024 21:32) (69 - 73)  BP: 142/52 (26 Sep 2024 21:32) (119/62 - 149/61)  RR: 18 (26 Sep 2024 21:32) (17 - 18)  SpO2: 97% (26 Sep 2024 21:32) (96% - 97%)  Parameters below as of 26 Sep 2024 21:32  Patient On (Oxygen Delivery Method): room air        General Appearance: Appears well, NAD  Neck: Supple  Chest: Equal expansion bilaterally  Breast: incision under left breast is c/d/i, axillary incision is c/d/i and nontender   CV: Pulse regular presently  Abdomen: Soft, nontender, nondistended  Extremities: Grossly symmetric, SCD's in place     I&O's Summary    25 Sep 2024 07:01  -  26 Sep 2024 07:00  --------------------------------------------------------  IN: 0 mL / OUT: 700 mL / NET: -700 mL    26 Sep 2024 07:01  -  27 Sep 2024 06:38  --------------------------------------------------------  IN: 0 mL / OUT: 850 mL / NET: -850 mL      I&O's Detail    25 Sep 2024 07:01  -  26 Sep 2024 07:00  --------------------------------------------------------  IN:  Total IN: 0 mL    OUT:    Voided (mL): 700 mL  Total OUT: 700 mL    Total NET: -700 mL      26 Sep 2024 07:01  -  27 Sep 2024 06:38  --------------------------------------------------------  IN:  Total IN: 0 mL    OUT:    Voided (mL): 850 mL  Total OUT: 850 mL    Total NET: -850 mL          MEDICATIONS  (STANDING):  acetaminophen     Tablet .. 975 milliGRAM(s) Oral every 6 hours  bisacodyl Suppository 10 milliGRAM(s) Rectal once  heparin   Injectable 5000 Unit(s) SubCutaneous every 8 hours  levothyroxine 75 MICROGram(s) Oral daily    MEDICATIONS  (PRN):  ondansetron Injectable 4 milliGRAM(s) IV Push every 6 hours PRN Nausea and/or Vomiting  oxyCODONE    IR 2.5 milliGRAM(s) Oral every 6 hours PRN Moderate Pain (4 - 6)  oxyCODONE    IR 5 milliGRAM(s) Oral every 6 hours PRN Severe Pain (7 - 10)      LABS:                RADIOLOGY & ADDITIONAL STUDIES:

## 2024-09-27 NOTE — CHART NOTE - NSCHARTNOTEFT_GEN_A_CORE
Patient will not need chemo/radiation while at rehab. Follow up with Dr. Thompson in the office for further management instructions.

## 2024-10-02 DIAGNOSIS — N65.0 DEFORMITY OF RECONSTRUCTED BREAST: ICD-10-CM

## 2024-10-02 DIAGNOSIS — N65.1 DISPROPORTION OF RECONSTRUCTED BREAST: ICD-10-CM

## 2024-10-02 DIAGNOSIS — Z85.3 PERSONAL HISTORY OF MALIGNANT NEOPLASM OF BREAST: ICD-10-CM

## 2024-10-02 LAB — SURGICAL PATHOLOGY STUDY: SIGNIFICANT CHANGE UP

## 2024-10-15 ENCOUNTER — OFFICE (OUTPATIENT)
Dept: URBAN - METROPOLITAN AREA CLINIC 94 | Facility: CLINIC | Age: 89
Setting detail: OPHTHALMOLOGY
End: 2024-10-15
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
## 2024-10-15 PROCEDURE — NO SHOW FE NO SHOW FEE: Performed by: OPHTHALMOLOGY

## 2024-12-21 ENCOUNTER — OUTPATIENT (OUTPATIENT)
Dept: OUTPATIENT SERVICES | Facility: HOSPITAL | Age: 88
LOS: 1 days | Discharge: HOME | End: 2024-12-21

## 2024-12-21 DIAGNOSIS — Z98.891 HISTORY OF UTERINE SCAR FROM PREVIOUS SURGERY: Chronic | ICD-10-CM

## 2024-12-21 DIAGNOSIS — C50.919 MALIGNANT NEOPLASM OF UNSPECIFIED SITE OF UNSPECIFIED FEMALE BREAST: ICD-10-CM

## 2024-12-21 DIAGNOSIS — Z98.49 CATARACT EXTRACTION STATUS, UNSPECIFIED EYE: Chronic | ICD-10-CM

## 2024-12-24 ENCOUNTER — APPOINTMENT (OUTPATIENT)
Dept: HEMATOLOGY ONCOLOGY | Facility: CLINIC | Age: 88
End: 2024-12-24
Payer: MEDICARE

## 2024-12-24 ENCOUNTER — NON-APPOINTMENT (OUTPATIENT)
Age: 88
End: 2024-12-24

## 2024-12-24 VITALS
RESPIRATION RATE: 16 BRPM | SYSTOLIC BLOOD PRESSURE: 165 MMHG | TEMPERATURE: 97 F | BODY MASS INDEX: 28.91 KG/M2 | OXYGEN SATURATION: 97 % | DIASTOLIC BLOOD PRESSURE: 77 MMHG | WEIGHT: 163.14 LBS | HEIGHT: 63 IN | HEART RATE: 82 BPM

## 2024-12-24 DIAGNOSIS — C50.212 MALIGNANT NEOPLASM OF UPPER-INNER QUADRANT OF LEFT FEMALE BREAST: ICD-10-CM

## 2024-12-24 DIAGNOSIS — B35.4 TINEA CORPORIS: ICD-10-CM

## 2024-12-24 DIAGNOSIS — Z17.0 MALIGNANT NEOPLASM OF UPPER-INNER QUADRANT OF LEFT FEMALE BREAST: ICD-10-CM

## 2024-12-24 DIAGNOSIS — E03.9 HYPOTHYROIDISM, UNSPECIFIED: ICD-10-CM

## 2024-12-24 PROCEDURE — G2211 COMPLEX E/M VISIT ADD ON: CPT

## 2024-12-24 PROCEDURE — 99205 OFFICE O/P NEW HI 60 MIN: CPT

## 2024-12-24 RX ORDER — NYSTATIN 100000 [USP'U]/G
100000 POWDER TOPICAL TWICE DAILY
Qty: 1 | Refills: 3 | Status: ACTIVE | COMMUNITY
Start: 2024-12-24 | End: 1900-01-01

## 2024-12-24 RX ORDER — ANASTROZOLE TABLETS 1 MG/1
1 TABLET ORAL DAILY
Qty: 1 | Refills: 2 | Status: ACTIVE | COMMUNITY
Start: 2024-12-24 | End: 1900-01-01

## 2024-12-26 ENCOUNTER — APPOINTMENT (OUTPATIENT)
Dept: RADIATION ONCOLOGY | Facility: CLINIC | Age: 88
End: 2024-12-26
Payer: MEDICARE

## 2024-12-26 VITALS
HEART RATE: 84 BPM | OXYGEN SATURATION: 98 % | RESPIRATION RATE: 16 BRPM | BODY MASS INDEX: 28.7 KG/M2 | DIASTOLIC BLOOD PRESSURE: 78 MMHG | WEIGHT: 162 LBS | HEIGHT: 63 IN | SYSTOLIC BLOOD PRESSURE: 132 MMHG

## 2024-12-26 DIAGNOSIS — Z78.9 OTHER SPECIFIED HEALTH STATUS: ICD-10-CM

## 2024-12-26 PROBLEM — E03.9 HYPOTHYROIDISM, UNSPECIFIED TYPE: Status: ACTIVE | Noted: 2024-12-26

## 2024-12-26 PROCEDURE — G2211 COMPLEX E/M VISIT ADD ON: CPT

## 2024-12-26 PROCEDURE — 99205 OFFICE O/P NEW HI 60 MIN: CPT

## 2025-01-06 PROCEDURE — 77333 RADIATION TREATMENT AID(S): CPT

## 2025-01-06 PROCEDURE — 77290 THER RAD SIMULAJ FIELD CPLX: CPT

## 2025-01-06 PROCEDURE — 77263 THER RADIOLOGY TX PLNG CPLX: CPT

## 2025-01-14 ENCOUNTER — OFFICE (OUTPATIENT)
Dept: URBAN - METROPOLITAN AREA CLINIC 94 | Facility: CLINIC | Age: OVER 89
Setting detail: OPHTHALMOLOGY
End: 2025-01-14
Payer: MEDICARE

## 2025-01-14 DIAGNOSIS — H34.8130: ICD-10-CM

## 2025-01-14 DIAGNOSIS — H34.8110: ICD-10-CM

## 2025-01-14 PROCEDURE — 67028 INJECTION EYE DRUG: CPT | Mod: RT | Performed by: OPHTHALMOLOGY

## 2025-01-14 PROCEDURE — 77334 RADIATION TREATMENT AID(S): CPT

## 2025-01-14 PROCEDURE — 77300 RADIATION THERAPY DOSE PLAN: CPT

## 2025-01-14 PROCEDURE — 77295 3-D RADIOTHERAPY PLAN: CPT

## 2025-01-14 PROCEDURE — 92134 CPTRZ OPH DX IMG PST SGM RTA: CPT | Performed by: OPHTHALMOLOGY

## 2025-01-14 ASSESSMENT — VISUAL ACUITY
OS_BCVA: 20/60
OD_BCVA: HM

## 2025-01-14 ASSESSMENT — CONFRONTATIONAL VISUAL FIELD TEST (CVF)
OD_FINDINGS: FULL
OS_FINDINGS: FULL

## 2025-01-14 ASSESSMENT — TONOMETRY
OS_IOP_MMHG: 20
OD_IOP_MMHG: 21

## 2025-02-03 PROCEDURE — 77280 THER RAD SIMULAJ FIELD SMPL: CPT

## 2025-02-04 PROCEDURE — G6017: CPT

## 2025-02-04 PROCEDURE — G6012: CPT

## 2025-02-04 PROCEDURE — 77427 RADIATION TX MANAGEMENT X5: CPT

## 2025-02-05 ENCOUNTER — NON-APPOINTMENT (OUTPATIENT)
Age: 89
End: 2025-02-05

## 2025-02-05 PROCEDURE — G6012: CPT

## 2025-02-05 PROCEDURE — G6017: CPT

## 2025-02-07 PROCEDURE — G6017: CPT

## 2025-02-07 PROCEDURE — G6012: CPT

## 2025-02-11 PROCEDURE — G6017: CPT

## 2025-02-11 PROCEDURE — G6012: CPT

## 2025-02-12 ENCOUNTER — NON-APPOINTMENT (OUTPATIENT)
Age: 89
End: 2025-02-12

## 2025-02-12 PROCEDURE — G6012: CPT

## 2025-02-12 PROCEDURE — G6017: CPT

## 2025-02-12 PROCEDURE — 77336 RADIATION PHYSICS CONSULT: CPT

## 2025-02-13 PROCEDURE — G6012: CPT

## 2025-02-13 PROCEDURE — 77427 RADIATION TX MANAGEMENT X5: CPT

## 2025-02-13 PROCEDURE — G6017: CPT

## 2025-02-14 PROCEDURE — G6012: CPT

## 2025-02-14 PROCEDURE — G6017: CPT

## 2025-02-18 ENCOUNTER — NON-APPOINTMENT (OUTPATIENT)
Age: 89
End: 2025-02-18

## 2025-02-18 PROCEDURE — G6012: CPT

## 2025-02-18 PROCEDURE — G6017: CPT

## 2025-02-19 PROCEDURE — G6017: CPT

## 2025-02-19 PROCEDURE — G6012: CPT

## 2025-02-20 PROCEDURE — 77336 RADIATION PHYSICS CONSULT: CPT

## 2025-02-20 PROCEDURE — G6012: CPT

## 2025-02-20 PROCEDURE — 77417 THER RADIOLOGY PORT IMAGE(S): CPT

## 2025-02-21 PROCEDURE — 77427 RADIATION TX MANAGEMENT X5: CPT

## 2025-02-21 PROCEDURE — G6017: CPT

## 2025-02-21 PROCEDURE — G6012: CPT

## 2025-02-25 PROCEDURE — G6012: CPT

## 2025-02-25 PROCEDURE — G6017: CPT

## 2025-02-26 ENCOUNTER — NON-APPOINTMENT (OUTPATIENT)
Age: 89
End: 2025-02-26

## 2025-02-27 ENCOUNTER — NON-APPOINTMENT (OUTPATIENT)
Age: 89
End: 2025-02-27

## 2025-02-27 PROCEDURE — G6017: CPT

## 2025-02-27 PROCEDURE — G6012: CPT

## 2025-02-28 PROCEDURE — G6012: CPT

## 2025-02-28 PROCEDURE — G6017: CPT

## 2025-03-03 ENCOUNTER — NON-APPOINTMENT (OUTPATIENT)
Age: 89
End: 2025-03-03

## 2025-03-03 PROCEDURE — 77336 RADIATION PHYSICS CONSULT: CPT

## 2025-03-03 PROCEDURE — G6017: CPT

## 2025-03-03 PROCEDURE — G6012: CPT

## 2025-03-03 RX ORDER — SILVER SULFADIAZINE 10 MG/G
1 CREAM TOPICAL TWICE DAILY
Qty: 1 | Refills: 0 | Status: ACTIVE | COMMUNITY
Start: 2025-03-03 | End: 1900-01-01

## 2025-03-04 PROCEDURE — 77427 RADIATION TX MANAGEMENT X5: CPT

## 2025-03-04 PROCEDURE — G6012: CPT

## 2025-03-04 PROCEDURE — 77280 THER RAD SIMULAJ FIELD SMPL: CPT

## 2025-03-04 PROCEDURE — G6017: CPT

## 2025-03-05 PROCEDURE — G6012: CPT

## 2025-03-06 ENCOUNTER — NON-APPOINTMENT (OUTPATIENT)
Age: 89
End: 2025-03-06

## 2025-03-06 PROCEDURE — G6012: CPT

## 2025-03-06 RX ORDER — CEPHALEXIN 500 MG/1
500 CAPSULE ORAL TWICE DAILY
Qty: 20 | Refills: 0 | Status: ACTIVE | COMMUNITY
Start: 2025-03-06 | End: 1900-01-01

## 2025-03-07 PROCEDURE — G6012: CPT

## 2025-03-07 PROCEDURE — 77417 THER RADIOLOGY PORT IMAGE(S): CPT

## 2025-03-11 PROCEDURE — 77336 RADIATION PHYSICS CONSULT: CPT

## 2025-03-11 PROCEDURE — G6012: CPT

## 2025-03-28 ENCOUNTER — NON-APPOINTMENT (OUTPATIENT)
Age: 89
End: 2025-03-28

## 2025-03-31 ENCOUNTER — OUTPATIENT (OUTPATIENT)
Dept: OUTPATIENT SERVICES | Facility: HOSPITAL | Age: 89
LOS: 1 days | Discharge: HOME | End: 2025-03-31

## 2025-03-31 DIAGNOSIS — Z98.49 CATARACT EXTRACTION STATUS, UNSPECIFIED EYE: Chronic | ICD-10-CM

## 2025-03-31 DIAGNOSIS — C50.919 MALIGNANT NEOPLASM OF UNSPECIFIED SITE OF UNSPECIFIED FEMALE BREAST: ICD-10-CM

## 2025-03-31 DIAGNOSIS — Z98.891 HISTORY OF UTERINE SCAR FROM PREVIOUS SURGERY: Chronic | ICD-10-CM

## 2025-04-02 ENCOUNTER — RESULT REVIEW (OUTPATIENT)
Age: 89
End: 2025-04-02

## 2025-04-02 ENCOUNTER — APPOINTMENT (OUTPATIENT)
Dept: HEMATOLOGY ONCOLOGY | Facility: CLINIC | Age: 89
End: 2025-04-02
Payer: MEDICARE

## 2025-04-02 VITALS
WEIGHT: 161.38 LBS | RESPIRATION RATE: 17 BRPM | SYSTOLIC BLOOD PRESSURE: 138 MMHG | BODY MASS INDEX: 28.59 KG/M2 | OXYGEN SATURATION: 98 % | DIASTOLIC BLOOD PRESSURE: 77 MMHG | HEART RATE: 74 BPM | TEMPERATURE: 97.2 F

## 2025-04-02 DIAGNOSIS — K21.9 GASTRO-ESOPHAGEAL REFLUX DISEASE W/OUT ESOPHAGITIS: ICD-10-CM

## 2025-04-02 DIAGNOSIS — K59.04 CHRONIC IDIOPATHIC CONSTIPATION: ICD-10-CM

## 2025-04-02 DIAGNOSIS — Z17.0 MALIGNANT NEOPLASM OF UPPER-INNER QUADRANT OF LEFT FEMALE BREAST: ICD-10-CM

## 2025-04-02 DIAGNOSIS — C50.212 MALIGNANT NEOPLASM OF UPPER-INNER QUADRANT OF LEFT FEMALE BREAST: ICD-10-CM

## 2025-04-02 LAB
ALBUMIN SERPL ELPH-MCNC: 3.9 G/DL
ALP BLD-CCNC: 80 U/L
ALT SERPL-CCNC: 7 U/L
ANION GAP SERPL CALC-SCNC: 7 MMOL/L
AST SERPL-CCNC: 13 U/L
BASOPHILS # BLD AUTO: 0.02 K/UL — SIGNIFICANT CHANGE UP (ref 0–0.2)
BASOPHILS NFR BLD AUTO: 0.3 % — SIGNIFICANT CHANGE UP (ref 0–2)
BILIRUB SERPL-MCNC: 0.5 MG/DL
BUN SERPL-MCNC: 16 MG/DL
CALCIUM SERPL-MCNC: 9.3 MG/DL
CHLORIDE SERPL-SCNC: 108 MMOL/L
CO2 SERPL-SCNC: 27 MMOL/L
CREAT SERPL-MCNC: 0.73 MG/DL
EGFRCR SERPLBLD CKD-EPI 2021: 78 ML/MIN/1.73M2
EOSINOPHIL # BLD AUTO: 0.08 K/UL — SIGNIFICANT CHANGE UP (ref 0–0.5)
EOSINOPHIL NFR BLD AUTO: 1.4 % — SIGNIFICANT CHANGE UP (ref 0–6)
GLUCOSE SERPL-MCNC: 92 MG/DL
HCT VFR BLD CALC: 34.5 % — SIGNIFICANT CHANGE UP (ref 34.5–45)
HGB BLD-MCNC: 11.5 G/DL — SIGNIFICANT CHANGE UP (ref 11.5–15.5)
IMM GRANULOCYTES NFR BLD AUTO: 0.3 % — SIGNIFICANT CHANGE UP (ref 0–0.9)
LYMPHOCYTES # BLD AUTO: 1.44 K/UL — SIGNIFICANT CHANGE UP (ref 1–3.3)
LYMPHOCYTES # BLD AUTO: 24.7 % — SIGNIFICANT CHANGE UP (ref 13–44)
MCHC RBC-ENTMCNC: 28.9 PG — SIGNIFICANT CHANGE UP (ref 27–34)
MCHC RBC-ENTMCNC: 33.3 G/DL — SIGNIFICANT CHANGE UP (ref 32–36)
MCV RBC AUTO: 86.7 FL — SIGNIFICANT CHANGE UP (ref 80–100)
MONOCYTES # BLD AUTO: 0.46 K/UL — SIGNIFICANT CHANGE UP (ref 0–0.9)
MONOCYTES NFR BLD AUTO: 7.9 % — SIGNIFICANT CHANGE UP (ref 2–14)
NEUTROPHILS # BLD AUTO: 3.8 K/UL — SIGNIFICANT CHANGE UP (ref 1.8–7.4)
NEUTROPHILS NFR BLD AUTO: 65.4 % — SIGNIFICANT CHANGE UP (ref 43–77)
NRBC BLD AUTO-RTO: 0 /100 WBCS — SIGNIFICANT CHANGE UP (ref 0–0)
PLATELET # BLD AUTO: 150 K/UL — SIGNIFICANT CHANGE UP (ref 150–400)
POTASSIUM SERPL-SCNC: 5 MMOL/L
PROT SERPL-MCNC: 6.4 G/DL
RBC # BLD: 3.98 M/UL — SIGNIFICANT CHANGE UP (ref 3.8–5.2)
RBC # FLD: 15 % — HIGH (ref 10.3–14.5)
SODIUM SERPL-SCNC: 142 MMOL/L
WBC # BLD: 5.82 K/UL — SIGNIFICANT CHANGE UP (ref 3.8–10.5)
WBC # FLD AUTO: 5.82 K/UL — SIGNIFICANT CHANGE UP (ref 3.8–10.5)

## 2025-04-02 PROCEDURE — G2211 COMPLEX E/M VISIT ADD ON: CPT

## 2025-04-02 PROCEDURE — 99213 OFFICE O/P EST LOW 20 MIN: CPT

## 2025-04-02 RX ORDER — PANTOPRAZOLE 20 MG/1
20 TABLET, DELAYED RELEASE ORAL DAILY
Qty: 30 | Refills: 0 | Status: ACTIVE | COMMUNITY
Start: 2025-04-02 | End: 1900-01-01

## 2025-04-03 LAB — CANCER AG27-29 SERPL-ACNC: 28.3 U/ML

## 2025-04-17 ENCOUNTER — NON-APPOINTMENT (OUTPATIENT)
Age: 89
End: 2025-04-17

## 2025-04-17 ENCOUNTER — APPOINTMENT (OUTPATIENT)
Dept: RADIATION ONCOLOGY | Facility: CLINIC | Age: 89
End: 2025-04-17

## 2025-04-23 ENCOUNTER — ASC (OUTPATIENT)
Dept: URBAN - METROPOLITAN AREA SURGERY 8 | Facility: SURGERY | Age: OVER 89
Setting detail: OPHTHALMOLOGY
End: 2025-04-23
Payer: MEDICARE

## 2025-04-23 ENCOUNTER — OFFICE (OUTPATIENT)
Dept: URBAN - METROPOLITAN AREA CLINIC 94 | Facility: CLINIC | Age: OVER 89
Setting detail: OPHTHALMOLOGY
End: 2025-04-23
Payer: MEDICARE

## 2025-04-23 DIAGNOSIS — H34.8130: ICD-10-CM

## 2025-04-23 DIAGNOSIS — H34.8110: ICD-10-CM

## 2025-04-23 PROCEDURE — 67210 TREATMENT OF RETINAL LESION: CPT | Mod: RT | Performed by: OPHTHALMOLOGY

## 2025-04-23 PROCEDURE — 92134 CPTRZ OPH DX IMG PST SGM RTA: CPT | Performed by: OPHTHALMOLOGY

## 2025-04-23 ASSESSMENT — TONOMETRY
OS_IOP_MMHG: 16
OD_IOP_MMHG: 14

## 2025-04-23 ASSESSMENT — VISUAL ACUITY
OD_BCVA: CF 2FT
OS_BCVA: 20/80+1

## 2025-04-23 ASSESSMENT — CONFRONTATIONAL VISUAL FIELD TEST (CVF)
OD_FINDINGS: FULL
OS_FINDINGS: FULL

## 2025-05-07 ENCOUNTER — APPOINTMENT (OUTPATIENT)
Dept: RADIATION ONCOLOGY | Facility: CLINIC | Age: 89
End: 2025-05-07
Payer: MEDICARE

## 2025-05-07 VITALS
DIASTOLIC BLOOD PRESSURE: 58 MMHG | SYSTOLIC BLOOD PRESSURE: 134 MMHG | BODY MASS INDEX: 28.35 KG/M2 | OXYGEN SATURATION: 98 % | WEIGHT: 160 LBS | RESPIRATION RATE: 16 BRPM | HEIGHT: 63 IN | HEART RATE: 74 BPM

## 2025-05-07 PROCEDURE — 99024 POSTOP FOLLOW-UP VISIT: CPT

## 2025-05-18 NOTE — ED ADULT TRIAGE NOTE - CADM TRG TX PRIOR TO ARRIVAL
none PMD Abhijeet Nephkimberly [facility], [facility] Onc  Patient declined translation services history with the assistance of family.  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  Patient had previously been admitted for abdominal pain CT at that shown worsening metastatic disease, RIVER/hyponatremia.  Patient's family states her abdominal pain had been controlled until recently.  Now unable to ambulate.  Physical exam elderly female awake alert looking uncomfortable, warm to touch  HEENT normocephalic atraumatic  Chest clear A&P.  Abdomen mild generalized tenderness, no rebound  CV tachycardic irregular,  Neuro GCS 15 moves all extremities  Silas Aquino MD, [facility]ep

## 2025-05-20 ENCOUNTER — OFFICE (OUTPATIENT)
Dept: URBAN - METROPOLITAN AREA CLINIC 94 | Facility: CLINIC | Age: OVER 89
Setting detail: OPHTHALMOLOGY
End: 2025-05-20
Payer: MEDICARE

## 2025-05-20 DIAGNOSIS — H34.8110: ICD-10-CM

## 2025-05-20 DIAGNOSIS — H34.8130: ICD-10-CM

## 2025-05-20 PROCEDURE — 92134 CPTRZ OPH DX IMG PST SGM RTA: CPT | Performed by: OPHTHALMOLOGY

## 2025-05-20 PROCEDURE — 67028 INJECTION EYE DRUG: CPT | Mod: 58,RT | Performed by: OPHTHALMOLOGY

## 2025-05-20 ASSESSMENT — CONFRONTATIONAL VISUAL FIELD TEST (CVF)
OD_FINDINGS: FULL
OS_FINDINGS: FULL

## 2025-05-20 ASSESSMENT — VISUAL ACUITY
OD_BCVA: CF 2FT
OS_BCVA: 20/80+

## 2025-07-01 ENCOUNTER — RESULT REVIEW (OUTPATIENT)
Age: 89
End: 2025-07-01

## 2025-07-01 ENCOUNTER — APPOINTMENT (OUTPATIENT)
Dept: HEMATOLOGY ONCOLOGY | Facility: CLINIC | Age: 89
End: 2025-07-01

## 2025-07-01 ENCOUNTER — OUTPATIENT (OUTPATIENT)
Dept: OUTPATIENT SERVICES | Facility: HOSPITAL | Age: 89
LOS: 1 days | Discharge: HOME | End: 2025-07-01

## 2025-07-01 DIAGNOSIS — C50.919 MALIGNANT NEOPLASM OF UNSPECIFIED SITE OF UNSPECIFIED FEMALE BREAST: ICD-10-CM

## 2025-07-01 DIAGNOSIS — Z98.49 CATARACT EXTRACTION STATUS, UNSPECIFIED EYE: Chronic | ICD-10-CM

## 2025-07-01 DIAGNOSIS — Z98.891 HISTORY OF UTERINE SCAR FROM PREVIOUS SURGERY: Chronic | ICD-10-CM

## 2025-07-01 LAB
BASOPHILS # BLD AUTO: 0.04 K/UL — SIGNIFICANT CHANGE UP (ref 0–0.2)
BASOPHILS NFR BLD AUTO: 0.5 % — SIGNIFICANT CHANGE UP (ref 0–2)
EOSINOPHIL # BLD AUTO: 0.05 K/UL — SIGNIFICANT CHANGE UP (ref 0–0.5)
EOSINOPHIL NFR BLD AUTO: 0.6 % — SIGNIFICANT CHANGE UP (ref 0–6)
HCT VFR BLD CALC: 40.7 % — SIGNIFICANT CHANGE UP (ref 34.5–45)
HGB BLD-MCNC: 13.7 G/DL — SIGNIFICANT CHANGE UP (ref 11.5–15.5)
IMM GRANULOCYTES NFR BLD AUTO: 0.5 % — SIGNIFICANT CHANGE UP (ref 0–0.9)
LYMPHOCYTES # BLD AUTO: 2.29 K/UL — SIGNIFICANT CHANGE UP (ref 1–3.3)
LYMPHOCYTES # BLD AUTO: 29 % — SIGNIFICANT CHANGE UP (ref 13–44)
MCHC RBC-ENTMCNC: 29.4 PG — SIGNIFICANT CHANGE UP (ref 27–34)
MCHC RBC-ENTMCNC: 33.7 G/DL — SIGNIFICANT CHANGE UP (ref 32–36)
MCV RBC AUTO: 87.3 FL — SIGNIFICANT CHANGE UP (ref 80–100)
MONOCYTES # BLD AUTO: 0.48 K/UL — SIGNIFICANT CHANGE UP (ref 0–0.9)
MONOCYTES NFR BLD AUTO: 6.1 % — SIGNIFICANT CHANGE UP (ref 2–14)
NEUTROPHILS # BLD AUTO: 5.01 K/UL — SIGNIFICANT CHANGE UP (ref 1.8–7.4)
NEUTROPHILS NFR BLD AUTO: 63.3 % — SIGNIFICANT CHANGE UP (ref 43–77)
NRBC BLD AUTO-RTO: 0 /100 WBCS — SIGNIFICANT CHANGE UP (ref 0–0)
PLATELET # BLD AUTO: 149 K/UL — LOW (ref 150–400)
RBC # BLD: 4.66 M/UL — SIGNIFICANT CHANGE UP (ref 3.8–5.2)
RBC # FLD: 15 % — HIGH (ref 10.3–14.5)
WBC # BLD: 7.91 K/UL — SIGNIFICANT CHANGE UP (ref 3.8–10.5)
WBC # FLD AUTO: 7.91 K/UL — SIGNIFICANT CHANGE UP (ref 3.8–10.5)

## 2025-07-02 ENCOUNTER — NON-APPOINTMENT (OUTPATIENT)
Age: 89
End: 2025-07-02

## 2025-07-02 LAB
ALBUMIN SERPL ELPH-MCNC: 4.2 G/DL
ALP BLD-CCNC: 87 U/L
ALT SERPL-CCNC: 13 U/L
ANION GAP SERPL CALC-SCNC: 14 MMOL/L
AST SERPL-CCNC: 22 U/L
BILIRUB SERPL-MCNC: 0.7 MG/DL
BUN SERPL-MCNC: 20 MG/DL
CALCIUM SERPL-MCNC: 10.3 MG/DL
CANCER AG27-29 SERPL-ACNC: 34.1 U/ML
CHLORIDE SERPL-SCNC: 103 MMOL/L
CHOLEST SERPL-MCNC: 213 MG/DL
CO2 SERPL-SCNC: 24 MMOL/L
CREAT SERPL-MCNC: 0.87 MG/DL
EGFRCR SERPLBLD CKD-EPI 2021: 63 ML/MIN/1.73M2
ESTIMATED AVERAGE GLUCOSE: 108 MG/DL
GLUCOSE SERPL-MCNC: 103 MG/DL
HBA1C MFR BLD HPLC: 5.4 %
HDLC SERPL-MCNC: 87 MG/DL
LDLC SERPL-MCNC: 111 MG/DL
NONHDLC SERPL-MCNC: 126 MG/DL
POTASSIUM SERPL-SCNC: 5.6 MMOL/L
PROT SERPL-MCNC: 7.2 G/DL
SODIUM SERPL-SCNC: 141 MMOL/L
T3FREE SERPL-MCNC: 1.98 PG/ML
T4 FREE SERPL-MCNC: 1.1 NG/DL
TRIGL SERPL-MCNC: 83 MG/DL
TSH SERPL-ACNC: 6.94 UIU/ML

## 2025-09-11 ENCOUNTER — APPOINTMENT (OUTPATIENT)
Dept: RADIATION ONCOLOGY | Facility: CLINIC | Age: 89
End: 2025-09-11

## (undated) DEVICE — DRSG TAPE MEDIPORE 6"

## (undated) DEVICE — DRSG XEROFORM 5 X 9"

## (undated) DEVICE — DRSG CURITY GAUZE SPONGE 4 X 4" 12-PLY

## (undated) DEVICE — POSITIONER STRAP ARMBOARD VELCRO TS-30

## (undated) DEVICE — DRAPE 3/4 SHEET 52X76"

## (undated) DEVICE — PACK MAJOR ABDOMINAL WITH LAP

## (undated) DEVICE — LIJ/LIA-AQUAMANTYS MACHINE #2 TL200-5370: Type: DURABLE MEDICAL EQUIPMENT

## (undated) DEVICE — ELCTR GROUNDING PAD ADULT COVIDIEN

## (undated) DEVICE — DRSG DERMABOND PRINEO 60CM

## (undated) DEVICE — VENODYNE/SCD SLEEVE CALF MEDIUM

## (undated) DEVICE — DRSG STERISTRIPS 0.5 X 4"

## (undated) DEVICE — SUT MONOCRYL 4-0 27" PS-2 UNDYED

## (undated) DEVICE — ELCTR BOVIE TIP BLADE MEGADYNE E-Z CLEAN 6.5" (LONG)

## (undated) DEVICE — SYR LUER LOK 10CC

## (undated) DEVICE — SUT VICRYL 2-0 27" SH UNDYED

## (undated) DEVICE — BLADE SURGICAL #15 CARBON

## (undated) DEVICE — LABELS BLANK W PEN

## (undated) DEVICE — DRAPE CHEST BREAST 106" X 122"

## (undated) DEVICE — DRSG COMBINE 5X9"

## (undated) DEVICE — SAFETY PIN

## (undated) DEVICE — ELCTR BOVIE TIP BLADE INSULATED 2.8" EDGE WITH SAFETY

## (undated) DEVICE — SHEATH SURG GUIDE SCOUT DISP STRL

## (undated) DEVICE — DRAIN RESERVOIR FOR JACKSON PRATT 100CC CARDINAL

## (undated) DEVICE — SOL IRR POUR H2O 1500ML

## (undated) DEVICE — SUT NYLON 2-0 18" FS

## (undated) DEVICE — WARMING BLANKET FULL ADULT

## (undated) DEVICE — DRAIN JACKSON PRATT 10MM FLAT FULL NO TROCAR

## (undated) DEVICE — ELCTR BOVIE PENCIL SMOKE EVACUATION

## (undated) DEVICE — LAP PAD W RING 18 X 18"

## (undated) DEVICE — ELCTR BOVIE TIP BLADE INSULATED 6.5" EDGE

## (undated) DEVICE — GLV 7 PROTEXIS (CREAM) MICRO

## (undated) DEVICE — DRAPE INSTRUMENT POUCH 6.75" X 11"

## (undated) DEVICE — DRSG BENZOIN 0.6CC

## (undated) DEVICE — DRSG DERMABOND 0.7ML

## (undated) DEVICE — NDL HYPO SAFE 25G X 1.5" (ORANGE)

## (undated) DEVICE — DRSG TAPE MEDIPORE 3"

## (undated) DEVICE — ONETRAC LIGHTED RETRACTOR 135 X 30MM DISP

## (undated) DEVICE — DRSG BIOPATCH DISK W CHG 1" W 4.0MM HOLE

## (undated) DEVICE — PACK MINOR VALUE CUSTOM

## (undated) DEVICE — GLV 6.5 PROTEXIS (CREAM) MICRO

## (undated) DEVICE — DRAPE LAPAROTOMY TRANSVERSE

## (undated) DEVICE — STAPLER SKIN VISI-STAT 35 WIDE